# Patient Record
Sex: FEMALE | Race: WHITE | NOT HISPANIC OR LATINO | Employment: OTHER | URBAN - NONMETROPOLITAN AREA
[De-identification: names, ages, dates, MRNs, and addresses within clinical notes are randomized per-mention and may not be internally consistent; named-entity substitution may affect disease eponyms.]

---

## 2021-04-12 ENCOUNTER — OFFICE VISIT (OUTPATIENT)
Dept: FAMILY MEDICINE CLINIC | Facility: CLINIC | Age: 61
End: 2021-04-12

## 2021-04-12 VITALS
SYSTOLIC BLOOD PRESSURE: 130 MMHG | HEIGHT: 64 IN | BODY MASS INDEX: 30.01 KG/M2 | HEART RATE: 61 BPM | OXYGEN SATURATION: 97 % | TEMPERATURE: 97.7 F | DIASTOLIC BLOOD PRESSURE: 62 MMHG | WEIGHT: 175.8 LBS

## 2021-04-12 DIAGNOSIS — Z00.00 GENERAL MEDICAL EXAM: ICD-10-CM

## 2021-04-12 DIAGNOSIS — E11.9 TYPE 2 DIABETES MELLITUS WITHOUT COMPLICATION, WITHOUT LONG-TERM CURRENT USE OF INSULIN (HCC): ICD-10-CM

## 2021-04-12 DIAGNOSIS — Z12.31 ENCOUNTER FOR SCREENING MAMMOGRAM FOR MALIGNANT NEOPLASM OF BREAST: ICD-10-CM

## 2021-04-12 DIAGNOSIS — Z76.89 ENCOUNTER TO ESTABLISH CARE: Primary | ICD-10-CM

## 2021-04-12 DIAGNOSIS — J30.9 ALLERGIC RHINITIS, UNSPECIFIED SEASONALITY, UNSPECIFIED TRIGGER: ICD-10-CM

## 2021-04-12 PROCEDURE — 99204 OFFICE O/P NEW MOD 45 MIN: CPT | Performed by: NURSE PRACTITIONER

## 2021-04-12 RX ORDER — OMEPRAZOLE 20 MG/1
20 CAPSULE, DELAYED RELEASE ORAL DAILY
COMMUNITY
End: 2021-05-12 | Stop reason: SDUPTHER

## 2021-04-12 RX ORDER — FENOFIBRATE 145 MG/1
145 TABLET, COATED ORAL DAILY
COMMUNITY
End: 2021-05-12 | Stop reason: SDUPTHER

## 2021-04-12 RX ORDER — GLIPIZIDE 10 MG/1
10 TABLET ORAL DAILY
COMMUNITY
End: 2021-05-12 | Stop reason: SDUPTHER

## 2021-04-12 RX ORDER — METFORMIN HYDROCHLORIDE 500 MG/1
1000 TABLET, EXTENDED RELEASE ORAL 2 TIMES DAILY
COMMUNITY
End: 2021-05-12 | Stop reason: SDUPTHER

## 2021-04-12 RX ORDER — DIPHENOXYLATE HYDROCHLORIDE AND ATROPINE SULFATE 2.5; .025 MG/1; MG/1
1 TABLET ORAL 3 TIMES DAILY
COMMUNITY
End: 2021-05-10

## 2021-04-12 RX ORDER — ATORVASTATIN CALCIUM 20 MG/1
20 TABLET, FILM COATED ORAL DAILY
COMMUNITY
End: 2021-05-12 | Stop reason: SDUPTHER

## 2021-04-12 RX ORDER — VENLAFAXINE HYDROCHLORIDE 150 MG/1
150 TABLET, EXTENDED RELEASE ORAL DAILY
COMMUNITY
End: 2021-05-12 | Stop reason: SDUPTHER

## 2021-04-12 RX ORDER — EMPAGLIFLOZIN AND LINAGLIPTIN 25; 5 MG/1; MG/1
25 TABLET, FILM COATED ORAL DAILY
COMMUNITY
End: 2021-05-12 | Stop reason: SDUPTHER

## 2021-04-12 RX ORDER — BACLOFEN 10 MG/1
10 TABLET ORAL 3 TIMES DAILY
COMMUNITY
End: 2021-05-03

## 2021-04-12 RX ORDER — LISINOPRIL 5 MG/1
5 TABLET ORAL DAILY
COMMUNITY
End: 2021-05-12 | Stop reason: SDUPTHER

## 2021-04-12 RX ORDER — ERGOCALCIFEROL 1.25 MG/1
50000 CAPSULE ORAL
COMMUNITY
End: 2021-05-12 | Stop reason: SDUPTHER

## 2021-04-12 RX ORDER — LORATADINE 10 MG/1
10 TABLET ORAL DAILY
Qty: 30 TABLET | Refills: 11 | Status: SHIPPED | OUTPATIENT
Start: 2021-04-12

## 2021-04-12 RX ORDER — IBUPROFEN 200 MG
400 TABLET ORAL 2 TIMES DAILY
COMMUNITY
End: 2021-05-12 | Stop reason: SDUPTHER

## 2021-04-12 RX ORDER — VERAPAMIL HYDROCHLORIDE 120 MG/1
120 TABLET, FILM COATED ORAL 2 TIMES DAILY
COMMUNITY
End: 2021-05-12 | Stop reason: SDUPTHER

## 2021-04-12 NOTE — PROGRESS NOTES
Subjective   Katiana Jin is a 61 y.o. female. Establish care    History of Present Illness   Patient presents today to establish care. She is accompanied by her niece who helped provide her information. Pt says she recently moved back to [place] after living in [place]. Her niece says she moved back here after her boyfriend  and she was hospitalized due to high blood sugar. During her hospitalization she was positive for COVID 19. Pt has a boyfriend but does not have any children. She says her dog is like her child. Past medical history includes: mixed hyperlipidemia, type 2 diabetes, essential hypertension, anxiety, and depression. Surgical history includes total hysterectomy and cholecystectomy. Denies any tobacco or alcohol use. Pt is interested in getting COVID 19 vaccine.     The following portions of the patient's history were reviewed and updated as appropriate: allergies, current medications, past family history, past medical history, past social history, past surgical history, and problem list.    Review of Systems   Constitutional: Negative for chills, fatigue and fever.   HENT: Positive for rhinorrhea. Negative for congestion, ear pain and sore throat.    Eyes: Negative for blurred vision, double vision and visual disturbance.   Respiratory: Negative for cough, chest tightness, shortness of breath and wheezing.    Cardiovascular: Negative for chest pain, palpitations and leg swelling.   Gastrointestinal: Negative for abdominal pain, diarrhea, nausea and vomiting.   Endocrine: Negative for cold intolerance and heat intolerance.   Genitourinary: Negative for difficulty urinating, dysuria, frequency and hematuria.   Musculoskeletal: Negative for arthralgias, back pain, neck pain and neck stiffness.   Skin: Negative for dry skin, pallor, rash, skin lesions and bruise.   Allergic/Immunologic: Negative for environmental allergies, food allergies and immunocompromised state.   Neurological:  Negative for dizziness, syncope, weakness, light-headedness, headache and confusion.   Hematological: Negative for adenopathy. Does not bruise/bleed easily.   Psychiatric/Behavioral: Negative for self-injury, sleep disturbance, suicidal ideas, depressed mood and stress. The patient is not nervous/anxious.        Vitals:    04/12/21 0823   BP: 130/62   Pulse: 61   Temp: 97.7 °F (36.5 °C)   SpO2: 97%     Body mass index is 30.18 kg/m².    Objective   Physical Exam  Vitals reviewed.   Constitutional:       General: She is not in acute distress.     Appearance: Normal appearance. She is well-developed. She is not ill-appearing.   HENT:      Head: Normocephalic.      Right Ear: Hearing, tympanic membrane, ear canal and external ear normal.      Left Ear: Hearing, tympanic membrane, ear canal and external ear normal.      Nose: Mucosal edema present.      Mouth/Throat:      Lips: Pink.      Mouth: Mucous membranes are moist.      Pharynx: Oropharynx is clear. Uvula midline. No oropharyngeal exudate.      Tonsils: No tonsillar exudate or tonsillar abscesses.   Eyes:      General: Lids are normal. Gaze aligned appropriately.      Extraocular Movements: Extraocular movements intact.      Conjunctiva/sclera: Conjunctivae normal.      Pupils: Pupils are equal, round, and reactive to light.   Neck:      Thyroid: No thyroid mass, thyromegaly or thyroid tenderness.   Cardiovascular:      Rate and Rhythm: Normal rate and regular rhythm.      Heart sounds: Normal heart sounds, S1 normal and S2 normal. No murmur heard.     Pulmonary:      Effort: Pulmonary effort is normal.      Breath sounds: Normal breath sounds and air entry. No decreased breath sounds, wheezing, rhonchi or rales.   Abdominal:      General: Abdomen is flat. Bowel sounds are normal.      Palpations: Abdomen is soft.      Tenderness: There is no abdominal tenderness. There is no right CVA tenderness, left CVA tenderness, guarding or rebound. Negative signs include  Kulkarni's sign, Rovsing's sign, McBurney's sign, psoas sign and obturator sign.   Musculoskeletal:         General: Normal range of motion.      Cervical back: Full passive range of motion without pain, normal range of motion and neck supple.   Lymphadenopathy:      Cervical: No cervical adenopathy.   Skin:     General: Skin is warm and dry.   Neurological:      General: No focal deficit present.      Mental Status: She is alert and oriented to person, place, and time.      Coordination: Coordination is intact.      Gait: Gait is intact.   Psychiatric:         Attention and Perception: Attention normal.         Mood and Affect: Mood normal.         Speech: Speech normal.         Behavior: Behavior normal. Behavior is cooperative.         Thought Content: Thought content normal.         Cognition and Memory: Cognition normal.         Judgment: Judgment normal.           Assessment/Plan   Diagnoses and all orders for this visit:    1. Encounter to establish care (Primary)    2. Encounter for screening mammogram for malignant neoplasm of breast  -     Mammo Screening Digital Tomosynthesis Bilateral With CAD; Future    3. General medical exam  -     Lipid Panel; Future  -     CBC (No Diff); Future  -     Comprehensive metabolic panel; Future  -     TSH; Future    4. Type 2 diabetes mellitus without complication, without long-term current use of insulin (CMS/[facility])  -     Hemoglobin A1c; Future    5. Allergic rhinitis, unspecified seasonality, unspecified trigger  -     loratadine (Claritin) 10 MG tablet; Take 1 tablet by mouth Daily.  Dispense: 30 tablet; Refill: 11    Mammogram ordered for breast cancer screening, will call pt with results.  General labs ordered and hemoglobin a1c ordered, pt to return for labs once I have labs from previous PCP.  Pt instructed to take Claritin 10 mg daily for allergic rhinitis.  Pt plans to get COVID 19 vaccine, pt educated to wait at least 90 days from positive COVID 19 test to receive  vaccine.   Awaiting records from previous PCP.  If symptoms do not improve or worsen, patient was instructed to return to clinic for further evaluation.         This document has been electronically signed by ANN-MARIE Mcgill on  April 12, 2021 10:49 CDT

## 2021-05-03 ENCOUNTER — OFFICE VISIT (OUTPATIENT)
Dept: FAMILY MEDICINE CLINIC | Facility: CLINIC | Age: 61
End: 2021-05-03

## 2021-05-03 VITALS
HEART RATE: 82 BPM | DIASTOLIC BLOOD PRESSURE: 72 MMHG | HEIGHT: 64 IN | WEIGHT: 182.6 LBS | OXYGEN SATURATION: 96 % | BODY MASS INDEX: 31.18 KG/M2 | SYSTOLIC BLOOD PRESSURE: 122 MMHG | TEMPERATURE: 97.5 F

## 2021-05-03 DIAGNOSIS — M54.2 NECK PAIN: ICD-10-CM

## 2021-05-03 DIAGNOSIS — M25.511 ACUTE PAIN OF RIGHT SHOULDER: Primary | ICD-10-CM

## 2021-05-03 DIAGNOSIS — M25.561 ACUTE PAIN OF RIGHT KNEE: ICD-10-CM

## 2021-05-03 PROCEDURE — 99213 OFFICE O/P EST LOW 20 MIN: CPT | Performed by: NURSE PRACTITIONER

## 2021-05-03 RX ORDER — TIZANIDINE HYDROCHLORIDE 4 MG/1
4 CAPSULE, GELATIN COATED ORAL 3 TIMES DAILY PRN
Qty: 90 CAPSULE | Refills: 0 | Status: SHIPPED | OUTPATIENT
Start: 2021-05-03 | End: 2021-10-25

## 2021-05-03 NOTE — PROGRESS NOTES
"Subjective   Katiana Jin is a 61 y.o. female. Pain     History of Present Illness   Patient presents with her niece today for pain. She says she is experiencing pain in her neck and right knee. Neck pain is located on right side and radiates to her right shoulder. This pain started about 2 months ago. Pt reports a fall. Xray's of cervical spine after fall were unremarkable. She was given muscle relaxer which she says didn't provide much relief. Her right knee pain started 2 days ago. She says her knee will \"lock up\" at times. Pain is located around medial joint line. Advil and tylenol have not helped. No recent injuries to right knee.    The following portions of the patient's history were reviewed and updated as appropriate: allergies, current medications, past family history, past medical history, past social history, past surgical history, and problem list.    Review of Systems   Constitutional: Negative for chills, fatigue and fever.   HENT: Negative for congestion, ear pain, rhinorrhea and sore throat.    Eyes: Negative for blurred vision, double vision and visual disturbance.   Respiratory: Negative for cough, chest tightness, shortness of breath and wheezing.    Cardiovascular: Negative for chest pain, palpitations and leg swelling.   Gastrointestinal: Negative for abdominal pain, diarrhea, nausea and vomiting.   Endocrine: Negative for cold intolerance and heat intolerance.   Genitourinary: Negative for difficulty urinating, dysuria, frequency and hematuria.   Musculoskeletal: Positive for arthralgias (right knee pain, right shoulder pain) and neck pain. Negative for back pain and neck stiffness.   Skin: Negative for dry skin, pallor, rash, skin lesions and bruise.   Allergic/Immunologic: Negative for environmental allergies, food allergies and immunocompromised state.   Neurological: Negative for dizziness, syncope, weakness, light-headedness, headache and confusion.   Hematological: Negative for " adenopathy. Does not bruise/bleed easily.   Psychiatric/Behavioral: Negative for self-injury, sleep disturbance, suicidal ideas, depressed mood and stress. The patient is not nervous/anxious.        Vitals:    05/03/21 1555   BP: 122/72   Pulse: 82   Temp: 97.5 °F (36.4 °C)   SpO2: 96%     Body mass index is 31.34 kg/m².    Objective   Physical Exam  Vitals and nursing note reviewed.   Constitutional:       General: She is not in acute distress.     Appearance: Normal appearance. She is well-developed. She is not ill-appearing.   HENT:      Head: Normocephalic.   Eyes:      Conjunctiva/sclera: Conjunctivae normal.   Neck:     Cardiovascular:      Rate and Rhythm: Normal rate and regular rhythm.      Heart sounds: Normal heart sounds, S1 normal and S2 normal. No murmur heard.     Pulmonary:      Effort: Pulmonary effort is normal.      Breath sounds: Normal breath sounds and air entry. No decreased breath sounds, wheezing, rhonchi or rales.   Musculoskeletal:         General: Normal range of motion.      Right shoulder: Bony tenderness (AC joint) present. No swelling, deformity, effusion, laceration, tenderness or crepitus. Normal range of motion. Normal strength. Normal pulse.      Cervical back: Normal range of motion and neck supple. Pain with movement present.   Skin:     General: Skin is warm and dry.   Neurological:      Mental Status: She is alert and oriented to person, place, and time.      Coordination: Coordination normal.      Gait: Gait normal.   Psychiatric:         Attention and Perception: Attention normal.         Mood and Affect: Mood normal.         Speech: Speech normal.         Behavior: Behavior normal. Behavior is cooperative.         Thought Content: Thought content normal.         Cognition and Memory: Cognition normal.         Judgment: Judgment normal.           Assessment/Plan   Diagnoses and all orders for this visit:    1. Acute pain of right shoulder (Primary)  -     XR Shoulder 2+ View  Right (In Office)    2. Acute pain of right knee  -     XR Knee 1 or 2 View Right (In Office)    3. Neck pain  -     TiZANidine (ZANAFLEX) 4 MG capsule; Take 1 capsule by mouth 3 (Three) Times a Day As Needed for Muscle Spasms.  Dispense: 90 capsule; Refill: 0    Xray's ordered of right shoulder and right knee. I suspect arthritis in shoulder and knee. Will call pt with xray results. Neck pain is more consistent with muscle strain.   Pt instructed to rest, ice, stretch neck.   Treatment of right shoulder and right knee will depend on xrays.  If symptoms do not improve or worsen, patient was instructed to return to clinic for further evaluation.         This document has been electronically signed by ANN-MARIE Mcgill on  May 4, 2021 09:20 CDT

## 2021-05-04 ENCOUNTER — LAB (OUTPATIENT)
Dept: LAB | Facility: HOSPITAL | Age: 61
End: 2021-05-04

## 2021-05-04 DIAGNOSIS — M19.90 ARTHRITIS: Primary | ICD-10-CM

## 2021-05-04 DIAGNOSIS — M19.90 ARTHRITIS: ICD-10-CM

## 2021-05-04 PROCEDURE — 80053 COMPREHEN METABOLIC PANEL: CPT

## 2021-05-05 LAB
ALBUMIN SERPL-MCNC: 4.3 G/DL (ref 3.5–5.2)
ALBUMIN/GLOB SERPL: 1.7 G/DL
ALP SERPL-CCNC: 88 U/L (ref 39–117)
ALT SERPL W P-5'-P-CCNC: 14 U/L (ref 1–33)
ANION GAP SERPL CALCULATED.3IONS-SCNC: 14.7 MMOL/L (ref 5–15)
AST SERPL-CCNC: 15 U/L (ref 1–32)
BILIRUB SERPL-MCNC: 0.4 MG/DL (ref 0–1.2)
BUN SERPL-MCNC: 25 MG/DL (ref 8–23)
BUN/CREAT SERPL: 27.2 (ref 7–25)
CALCIUM SPEC-SCNC: 9.6 MG/DL (ref 8.6–10.5)
CHLORIDE SERPL-SCNC: 101 MMOL/L (ref 98–107)
CO2 SERPL-SCNC: 24.3 MMOL/L (ref 22–29)
CREAT SERPL-MCNC: 0.92 MG/DL (ref 0.57–1)
GFR SERPL CREATININE-BSD FRML MDRD: 62 ML/MIN/1.73
GLOBULIN UR ELPH-MCNC: 2.5 GM/DL
GLUCOSE SERPL-MCNC: 300 MG/DL (ref 65–99)
POTASSIUM SERPL-SCNC: 4.3 MMOL/L (ref 3.5–5.2)
PROT SERPL-MCNC: 6.8 G/DL (ref 6–8.5)
SODIUM SERPL-SCNC: 140 MMOL/L (ref 136–145)

## 2021-05-06 ENCOUNTER — LAB (OUTPATIENT)
Dept: LAB | Facility: HOSPITAL | Age: 61
End: 2021-05-06

## 2021-05-06 DIAGNOSIS — E11.9 TYPE 2 DIABETES MELLITUS WITHOUT COMPLICATION, WITHOUT LONG-TERM CURRENT USE OF INSULIN (HCC): ICD-10-CM

## 2021-05-06 DIAGNOSIS — E11.9 TYPE 2 DIABETES MELLITUS WITHOUT COMPLICATION, WITHOUT LONG-TERM CURRENT USE OF INSULIN (HCC): Primary | ICD-10-CM

## 2021-05-06 PROCEDURE — 83036 HEMOGLOBIN GLYCOSYLATED A1C: CPT

## 2021-05-07 LAB — HBA1C MFR BLD: 9.9 % (ref 4.8–5.6)

## 2021-05-10 ENCOUNTER — OFFICE VISIT (OUTPATIENT)
Dept: FAMILY MEDICINE CLINIC | Facility: CLINIC | Age: 61
End: 2021-05-10

## 2021-05-10 VITALS
HEART RATE: 78 BPM | WEIGHT: 180.4 LBS | OXYGEN SATURATION: 98 % | HEIGHT: 64 IN | SYSTOLIC BLOOD PRESSURE: 120 MMHG | BODY MASS INDEX: 30.8 KG/M2 | DIASTOLIC BLOOD PRESSURE: 78 MMHG

## 2021-05-10 DIAGNOSIS — M25.511 ACUTE PAIN OF RIGHT SHOULDER: ICD-10-CM

## 2021-05-10 DIAGNOSIS — E11.9 TYPE 2 DIABETES MELLITUS WITHOUT COMPLICATION, WITHOUT LONG-TERM CURRENT USE OF INSULIN (HCC): Primary | ICD-10-CM

## 2021-05-10 PROCEDURE — 99213 OFFICE O/P EST LOW 20 MIN: CPT | Performed by: NURSE PRACTITIONER

## 2021-05-10 RX ORDER — VENLAFAXINE HYDROCHLORIDE 150 MG/1
150 TABLET, EXTENDED RELEASE ORAL DAILY
Qty: 30 EACH | Status: CANCELLED | OUTPATIENT
Start: 2021-05-10

## 2021-05-10 NOTE — PROGRESS NOTES
"Subjective   Katiana Jin is a 61 y.o. female. Diabetes     History of Present Illness   Patient presents today with her niece for diabetes. Her recent hemoglobin a1c was 9. Her niece says she was forgetting to take her medications but now that she has moved closer to her she is ensuring that she is taking all diabetic medications as prescribed. Her niece reports that her hemoglobin a1c was at 14 when she was hospitalized in April. Pt says she does not follow diabetic diet. She eats twice daily. Yesterday she had [employer] cereal for breakfast and captain [employer]'s for dinner. Pt reports drinking diet sodas and water for hydration.     Shoulder pain (right)  Her niece is also concerned about her right shoulder. Pt says her shoulder pain has improved with tylenol and ibuprofen but her niece recently found out she had a \"tear in her shoulder\" and it was recommended she have surgery. She is unsure which shoulder was needing surgery. [place] Pt denies any weakness or pain in her left shoulder. Her niece would like her shoulder further evaluated. Recent xray of right shoulder revealed mild degenerative changes.    The following portions of the patient's history were reviewed and updated as appropriate: allergies, current medications, past family history, past medical history, past social history, past surgical history, and problem list.    Review of Systems   Constitutional: Negative for chills, fatigue and fever.   HENT: Negative for congestion, ear pain, rhinorrhea and sore throat.    Eyes: Negative for blurred vision, double vision and visual disturbance.   Respiratory: Negative for cough, chest tightness, shortness of breath and wheezing.    Cardiovascular: Negative for chest pain, palpitations and leg swelling.   Gastrointestinal: Negative for abdominal pain, diarrhea, nausea and vomiting.   Endocrine: Negative for cold intolerance and heat intolerance. "   Genitourinary: Negative for difficulty urinating, dysuria, frequency and hematuria.   Musculoskeletal: Positive for arthralgias (shoulder pain (right)). Negative for back pain, neck pain and neck stiffness.   Skin: Negative for dry skin, pallor, rash, skin lesions and bruise.   Allergic/Immunologic: Negative for environmental allergies, food allergies and immunocompromised state.   Neurological: Negative for dizziness, syncope, weakness, light-headedness, headache and confusion.   Hematological: Negative for adenopathy. Does not bruise/bleed easily.   Psychiatric/Behavioral: Negative for self-injury, sleep disturbance, suicidal ideas, depressed mood and stress. The patient is not nervous/anxious.        Vitals:    05/10/21 1321   BP: 120/78   Pulse: 78   SpO2: 98%     Body mass index is 30.97 kg/m².    Objective   Physical Exam  Vitals and nursing note reviewed.   Constitutional:       Appearance: She is well-developed.   HENT:      Head: Normocephalic.   Eyes:      Conjunctiva/sclera: Conjunctivae normal.   Musculoskeletal:         General: Normal range of motion.      Right shoulder: Normal. No tenderness or bony tenderness. Normal range of motion. Normal strength.      Cervical back: Full passive range of motion without pain, normal range of motion and neck supple.   Skin:     General: Skin is warm and dry.   Neurological:      General: No focal deficit present.      Mental Status: She is alert and oriented to person, place, and time.      Coordination: Coordination normal.      Gait: Gait is intact. Gait normal.   Psychiatric:         Attention and Perception: Attention normal.         Mood and Affect: Mood normal.         Speech: Speech normal.         Behavior: Behavior normal. Behavior is cooperative.         Thought Content: Thought content normal.         Cognition and Memory: Cognition normal.         Judgment: Judgment normal.           Assessment/Plan   Diagnoses and all orders for this visit:    1.  "Type 2 diabetes mellitus without complication, without long-term current use of insulin (CMS/[facility]) (Primary)  -     Hemoglobin A1c; Future    2. Acute pain of right shoulder  -     Ambulatory Referral to Orthopedic Surgery    Other orders  -     Cancel: diclofenac (VOLTAREN) 50 MG EC tablet; Take 1 tablet by mouth 2 (Two) Times a Day As Needed (arthritis pain).  Dispense: 60 tablet; Refill: 1    Type 2 diabetes- Pt not currently eating diabetic diet. Stressed importance of eating diabetic diet to help improve glycemic control. Recommended eating more lean meats (chicken, fish), vegetables, fruit (in moderation). Limit/avoid foods high in carbohydrates (breads, pastas, rice, chips, potatos, cake, cookies) and eating fast food. Recommended checking fasting blood sugar in the a.m and keeping diary for my review. Plan to keep diabetic medications the same at this time due to possible non compliance. Will recheck hemoglobin a1c in 3 months.  Referral to Orthopedics for report of \"tear in shoulder that required surgery\". Right shoulder exam unremarkable, recent xrays show mild arthritis changes. Tylenol and ibuprofen have been providing pain relief.  If symptoms do not improve or worsen, patient was instructed to return to clinic for further evaluation.         This document has been electronically signed by ANN-MARIE Mcgill on  May 10, 2021 15:17 CDT             "

## 2021-05-12 RX ORDER — EMPAGLIFLOZIN AND LINAGLIPTIN 25; 5 MG/1; MG/1
25 TABLET, FILM COATED ORAL DAILY
Qty: 90 TABLET | Refills: 0 | Status: SHIPPED | OUTPATIENT
Start: 2021-05-12 | End: 2021-08-10

## 2021-05-12 RX ORDER — IBUPROFEN 200 MG
400 TABLET ORAL EVERY 8 HOURS PRN
Qty: 60 TABLET | Refills: 1 | Status: SHIPPED | OUTPATIENT
Start: 2021-05-12 | End: 2022-01-19

## 2021-05-12 RX ORDER — ATORVASTATIN CALCIUM 20 MG/1
20 TABLET, FILM COATED ORAL NIGHTLY
Qty: 90 TABLET | Refills: 3 | Status: SHIPPED | OUTPATIENT
Start: 2021-05-12

## 2021-05-12 RX ORDER — LISINOPRIL 5 MG/1
5 TABLET ORAL DAILY
Qty: 90 TABLET | Refills: 3 | Status: SHIPPED | OUTPATIENT
Start: 2021-05-12

## 2021-05-12 RX ORDER — OMEPRAZOLE 20 MG/1
20 CAPSULE, DELAYED RELEASE ORAL DAILY
Qty: 90 CAPSULE | Refills: 3 | Status: SHIPPED | OUTPATIENT
Start: 2021-05-12

## 2021-05-12 RX ORDER — METFORMIN HYDROCHLORIDE 500 MG/1
1000 TABLET, EXTENDED RELEASE ORAL 2 TIMES DAILY
Qty: 180 TABLET | Refills: 0 | Status: SHIPPED | OUTPATIENT
Start: 2021-05-12 | End: 2021-08-10 | Stop reason: SDUPTHER

## 2021-05-12 RX ORDER — VENLAFAXINE HYDROCHLORIDE 150 MG/1
150 TABLET, EXTENDED RELEASE ORAL DAILY
Qty: 90 EACH | Refills: 3 | Status: SHIPPED | OUTPATIENT
Start: 2021-05-12 | End: 2021-05-13

## 2021-05-12 RX ORDER — GLIPIZIDE 10 MG/1
10 TABLET ORAL DAILY
Qty: 90 TABLET | Refills: 0 | Status: SHIPPED | OUTPATIENT
Start: 2021-05-12 | End: 2021-08-10

## 2021-05-12 RX ORDER — ERGOCALCIFEROL 1.25 MG/1
CAPSULE ORAL
Qty: 6 CAPSULE | Refills: 0 | Status: SHIPPED | OUTPATIENT
Start: 2021-05-12 | End: 2021-08-02

## 2021-05-12 RX ORDER — FENOFIBRATE 48 MG/1
48 TABLET, COATED ORAL DAILY
Qty: 90 TABLET | Refills: 3 | Status: SHIPPED | OUTPATIENT
Start: 2021-05-12 | End: 2022-01-19

## 2021-05-12 RX ORDER — VERAPAMIL HYDROCHLORIDE 120 MG/1
120 TABLET, FILM COATED ORAL 2 TIMES DAILY
Qty: 180 TABLET | Refills: 3 | Status: SHIPPED | OUTPATIENT
Start: 2021-05-12

## 2021-05-12 NOTE — TELEPHONE ENCOUNTER
REFILL  10 PRESCRIPTIONS I SELECTED AND EFFEXOR (IT SAID IT WAS ALREADY PENDING)  ANGELES [facility]

## 2021-05-13 RX ORDER — VENLAFAXINE HYDROCHLORIDE 150 MG/1
150 CAPSULE, EXTENDED RELEASE ORAL DAILY
Qty: 90 CAPSULE | Refills: 3 | Status: SHIPPED | OUTPATIENT
Start: 2021-05-13

## 2021-05-14 RX ORDER — METFORMIN HYDROCHLORIDE 500 MG/1
TABLET, EXTENDED RELEASE ORAL
Qty: 360 TABLET | OUTPATIENT
Start: 2021-05-14

## 2021-06-28 RX ORDER — METFORMIN HYDROCHLORIDE 500 MG/1
TABLET, EXTENDED RELEASE ORAL
Qty: 180 TABLET | Refills: 0 | OUTPATIENT
Start: 2021-06-28

## 2021-07-21 ENCOUNTER — OFFICE VISIT (OUTPATIENT)
Dept: ORTHOPEDIC SURGERY | Facility: CLINIC | Age: 61
End: 2021-07-21

## 2021-07-21 VITALS — WEIGHT: 193 LBS | HEIGHT: 64 IN | BODY MASS INDEX: 32.95 KG/M2

## 2021-07-21 DIAGNOSIS — M75.101 ROTATOR CUFF SYNDROME OF RIGHT SHOULDER: ICD-10-CM

## 2021-07-21 DIAGNOSIS — M25.511 CHRONIC RIGHT SHOULDER PAIN: Primary | ICD-10-CM

## 2021-07-21 DIAGNOSIS — G89.29 CHRONIC RIGHT SHOULDER PAIN: Primary | ICD-10-CM

## 2021-07-21 PROCEDURE — 99214 OFFICE O/P EST MOD 30 MIN: CPT | Performed by: NURSE PRACTITIONER

## 2021-07-21 PROCEDURE — 20610 DRAIN/INJ JOINT/BURSA W/O US: CPT | Performed by: NURSE PRACTITIONER

## 2021-07-21 RX ORDER — TRIAMCINOLONE ACETONIDE 40 MG/ML
40 INJECTION, SUSPENSION INTRA-ARTICULAR; INTRAMUSCULAR
Status: COMPLETED | OUTPATIENT
Start: 2021-07-21 | End: 2021-07-21

## 2021-07-21 RX ORDER — LIDOCAINE HYDROCHLORIDE 10 MG/ML
2 INJECTION, SOLUTION INFILTRATION; PERINEURAL
Status: COMPLETED | OUTPATIENT
Start: 2021-07-21 | End: 2021-07-21

## 2021-07-21 RX ADMIN — LIDOCAINE HYDROCHLORIDE 2 ML: 10 INJECTION, SOLUTION INFILTRATION; PERINEURAL at 10:59

## 2021-07-21 RX ADMIN — TRIAMCINOLONE ACETONIDE 40 MG: 40 INJECTION, SUSPENSION INTRA-ARTICULAR; INTRAMUSCULAR at 10:59

## 2021-07-21 NOTE — PROGRESS NOTES
"Katiana Jin is a 61 y.o. female   Primary provider:  Veena Simmons APRN       Chief Complaint   Patient presents with   • Right Shoulder - Shoulder Pain     HISTORY OF PRESENT ILLNESS:     61-year-old female patient presents to office accompanied by her daughter for evaluation of chronic right shoulder pain.  Onset of pain occurred approximately 2 years ago with no known injury.  Patient has recently moved here from [place].  She was previously evaluated by provider in [place] and was told that she needed surgery and had a \"tear\" in her right shoulder.  The patient does not know any other details and the daughter is unsure of the details as well.  Patient was evaluated by her primary care provider, ANN-MARIE Mcgill on 5/3/2021 with x-rays performed.  Patient states that her pain has increased in recent months and they attributed to possibly her move in May 2021 as she was lifting more items and boxes at that time. Patient complains of sleep disturbance due to aching pain in her right shoulder at nighttime.  Pain is described as constant and severe.  Pain is described as aching and burning in nature with weakness and limited range of motion.  Pain is worse with sleeping and is also worse with exertional use with her right arm for lifting.  Pain improves mildly with rest, anti-inflammatory medications and muscle relaxants.    Arm Pain   The incident occurred more than 1 week ago (About 2 years ago). There was no injury mechanism. The pain is present in the right shoulder. The quality of the pain is described as aching and burning. The pain is at a severity of 0/10. The pain is severe. The pain has been constant since the incident. Associated symptoms include muscle weakness. Associated symptoms comments: Limited ROM, weakness. She has tried acetaminophen, NSAIDs and rest (muscle relaxant) for the symptoms. The treatment provided mild relief.     CONCURRENT MEDICAL HISTORY:    Past Medical History: "   Diagnosis Date   • Anxiety and depression    • Diabetes (CMS/[facility])    • Essential hypertension    • GERD (gastroesophageal reflux disease)    • Hyperlipidemia        Allergies   Allergen Reactions   • Albuterol GI Intolerance         Current Outpatient Medications:   •  atorvastatin (LIPITOR) 20 MG tablet, Take 1 tablet by mouth Every Night., Disp: 90 tablet, Rfl: 3  •  Empagliflozin-linaGLIPtin (Glyxambi) 25-5 MG tablet, Take 25 mg by mouth Daily., Disp: 90 tablet, Rfl: 0  •  fenofibrate (TRICOR) 48 MG tablet, Take 1 tablet by mouth Daily., Disp: 90 tablet, Rfl: 3  •  glipizide (GLUCOTROL) 10 MG tablet, Take 1 tablet by mouth Daily., Disp: 90 tablet, Rfl: 0  •  ibuprofen (ADVIL,MOTRIN) 200 MG tablet, Take 2 tablets by mouth Every 8 (Eight) Hours As Needed for Mild Pain  or Moderate Pain ., Disp: 60 tablet, Rfl: 1  •  lisinopril (PRINIVIL,ZESTRIL) 5 MG tablet, Take 1 tablet by mouth Daily., Disp: 90 tablet, Rfl: 3  •  metFORMIN ER (GLUCOPHAGE-XR) 500 MG 24 hr tablet, Take 2 tablets by mouth 2 (two) times a day., Disp: 180 tablet, Rfl: 0  •  omeprazole (priLOSEC) 20 MG capsule, Take 1 capsule by mouth Daily., Disp: 90 capsule, Rfl: 3  •  venlafaxine XR (EFFEXOR-XR) 150 MG 24 hr capsule, Take 1 capsule by mouth Daily., Disp: 90 capsule, Rfl: 3  •  vitamin D (ERGOCALCIFEROL) 1.25 MG (39749 UT) capsule capsule, Twice montly on 1st and 15th, Disp: 6 capsule, Rfl: 0  •  loratadine (Claritin) 10 MG tablet, Take 1 tablet by mouth Daily., Disp: 30 tablet, Rfl: 11  •  TiZANidine (ZANAFLEX) 4 MG capsule, Take 1 capsule by mouth 3 (Three) Times a Day As Needed for Muscle Spasms., Disp: 90 capsule, Rfl: 0  •  verapamil (CALAN) 120 MG tablet, Take 1 tablet by mouth 2 (two) times a day. 1  To 2 times daily, Disp: 180 tablet, Rfl: 3    Past Surgical History:   Procedure Laterality Date   • CHOLECYSTECTOMY     • TOTAL ABDOMINAL HYSTERECTOMY         Family History   Problem Relation Age of Onset   • Emphysema Mother    • Diabetes  "Father    • Lung cancer Father    • Stroke Brother    • Stomach cancer Maternal Grandmother         Social History     Socioeconomic History   • Marital status: Single     Spouse name: Not on file   • Number of children: Not on file   • Years of education: Not on file   • Highest education level: Not on file   Tobacco Use   • Smoking status: Never Smoker   • Smokeless tobacco: Never Used        Review of Systems   Constitutional: Positive for activity change.   HENT: Negative.    Respiratory: Negative.    Cardiovascular: Negative.    Gastrointestinal: Negative.    Endocrine: Positive for cold intolerance and heat intolerance.   Genitourinary: Negative.    Musculoskeletal: Positive for arthralgias.        Right shoulder pain.    Skin: Negative.    Allergic/Immunologic: Negative.    Neurological: Positive for dizziness.   Hematological: Negative.    Psychiatric/Behavioral: Positive for sleep disturbance.       PHYSICAL EXAMINATION:       Ht 162.6 cm (64\")   Wt 87.5 kg (193 lb)   BMI 33.13 kg/m²     Physical Exam  Vitals reviewed.   Constitutional:       General: She is not in acute distress.     Appearance: She is well-developed. She is not ill-appearing.   HENT:      Head: Normocephalic.   Pulmonary:      Effort: Pulmonary effort is normal. No respiratory distress.   Abdominal:      General: There is no distension.      Palpations: Abdomen is soft.   Musculoskeletal:         General: Tenderness (Mild, right shoulder) present. No swelling, deformity or signs of injury.   Skin:     General: Skin is warm and dry.      Capillary Refill: Capillary refill takes less than 2 seconds.      Findings: No erythema.   Neurological:      Mental Status: She is alert and oriented to person, place, and time.      GCS: GCS eye subscore is 4. GCS verbal subscore is 5. GCS motor subscore is 6.   Psychiatric:         Speech: Speech normal.         Behavior: Behavior normal.         Thought Content: Thought content normal.         " Judgment: Judgment normal.         GAIT:     [x]  Normal  []  Antalgic    Assistive device: [x]  None  []  Walker     []  Crutches  []  Cane     []  Wheelchair  []  Stretcher    Right Shoulder Exam     Range of Motion   Active abduction: 160   External rotation: 80   Forward flexion: 140   Internal rotation 90 degrees: 70     Muscle Strength   Abduction: 3/5   Supraspinatus: 3/5     Tests   Judd test: positive  Cross arm: positive  Impingement: positive  Drop arm: negative    Other   Erythema: absent  Sensation: normal  Pulse: present    Comments:  Weakness is present.  Empty can test positive.  Full can test positive.      Left Shoulder Exam     Tenderness   The patient is experiencing no tenderness.     Range of Motion   The patient has normal left shoulder ROM.    Muscle Strength   Abduction: 4/5   Supraspinatus: 4/5     Tests   Judd test: negative  Cross arm: negative  Impingement: negative  Drop arm: negative    Other   Erythema: absent  Sensation: normal  Pulse: present               Narrative & Impression   EXAM: XR SHOULDER 2 OR MORE VIEWS     INDICATION: right shoulder pain, fall back in march, M25.511 Pain  in right shoulder     COMPARISON: None     FINDINGS: The right shoulder is negative for acute fracture or  dislocation. Mild hypertrophic degenerative change about the  right glenohumeral and acromioclavicular joints. No periarticular  calcification.     IMPRESSION:  Mild degenerative arthritis about the right shoulder  without acute bony abnormality.        Electronically signed by:  Josef Rivero MD  5/4/2021 7:33 AM CDT  Workstation: [id number]     ASSESSMENT:    Diagnoses and all orders for this visit:    Chronic right shoulder pain  -     Large Joint Arthrocentesis: R subacromial bursa    Rotator cuff syndrome of right shoulder  -     Large Joint Arthrocentesis: R subacromial bursa      Large Joint Arthrocentesis: R subacromial bursa  Date/Time: 7/21/2021 10:59 AM  Consent given by:  "patient  Timeout: Immediately prior to procedure a time out was called to verify the correct patient, procedure, equipment, support staff and site/side marked as required   Supporting Documentation  Indications: pain and diagnostic evaluation   Procedure Details  Location: shoulder - R subacromial bursa  Preparation: Patient was prepped and draped in the usual sterile fashion  Needle size: 22 G  Approach: posterior  Medications administered: 40 mg triamcinolone acetonide 40 MG/ML; 2 mL lidocaine 1 %  Patient tolerance: patient tolerated the procedure well with no immediate complications      PLAN    X-rays of the right shoulder performed on 5/3/2021 are independently reviewed by myself today.  Patient has some mild degenerative changes at both the AC joint and glenohumeral joint.  Patient has experienced ongoing right shoulder pain for at least 2 years with no known injury.  Patient has recently moved here from [place] and is with her daughter today.  She was treated in [place] for her right shoulder pain and was told at some point that she had \"tears\" in her right shoulder and may need surgery.  We discussed that she likely has rotator cuff tears.  It is unclear if she had MRI imaging or if this was an educated guess for differential diagnoses.  Patient states that her pain has increased since May 2021 with no known injury or incident but she has recently moved around the time of the increased pain and was doing more lifting.  Patient primarily complains of aching pain in the right shoulder at nighttime that bothers her quite a bit.  We discussed ordering MRI for further evaluation and to assess the integrity of rotator cuff tendons.  We discussed she may have degenerative tears of the rotator cuff.  We also discussed the possibility of tendinitis/tendinosis, bursitis and/or impingement syndrome.  Patient and daughter want to wait on MRI imaging for now and proceed with some conservative care.  Recommend a " subacromial injection of steroid to the right shoulder today for management of pain/inflammation.  We discussed the risk of hyperglycemia with steroid use as she is diabetic.  I have reviewed her recent blood sugars as she has a log with her and her blood sugar appears to be well controlled over the last few months.  We discussed that although it is a small dose in a localized space, she may still have some transient elevations in her blood sugar following the injection but this should resolve.  Patient is encouraged to follow her diabetic diet and take her diabetic medications as well as monitor her blood sugar.    Recommend the following:  Rest and activity modification with avoidance of heavy lifting, pulling, tugging and repetitive motions for now.  Gentle, progressive range of motion exercises with the right shoulder intermittently throughout the day as pain allows.  Ice therapy to the right shoulder intermittently 3-4 times daily for 15 minutes at a time to minimize pain/inflammation.  Continue with Voltaren for consistent dosing of oral NSAIDs as previously prescribed by her PCP.  Patient can also take Tylenol as needed for additional pain control.    Follow-up in 4 weeks for recheck as needed for any new, worsening or persistent symptoms.  We discussed that if her pain improves/resolves, she can follow-up on an as-needed basis.  If she continues to have pain, limited range of motion and/or weakness, we discussed that physical therapy would be an option as well.  Consider MRI of the right shoulder if her pain persist.    Injection.  Discussed MRI if not improving.  Discussed physical therapy as an option as well. Continue Voltaren.     EMR Dragon/Transciption Disclaimer: Some of this note may be an electronic transcription/translation of spoken language to printed text.  The electronic translation of spoken language may permit erroneous, or at times, nonsensical words or phrases to be inadvertently  transcribed. Although I have reviewed the note for such errors, some may still exist.     Return in about 4 weeks (around 8/18/2021), or if symptoms worsen or fail to improve, for Recheck.        This document has been electronically signed by ANN-MARIE Arrieta on July 21, 2021 13:19 CDT      ANN-MARIE Arrieta

## 2021-08-02 RX ORDER — ERGOCALCIFEROL 1.25 MG/1
CAPSULE ORAL
Qty: 6 CAPSULE | Refills: 0 | Status: SHIPPED | OUTPATIENT
Start: 2021-08-02 | End: 2022-01-19

## 2021-08-10 RX ORDER — EMPAGLIFLOZIN AND LINAGLIPTIN 25; 5 MG/1; MG/1
TABLET, FILM COATED ORAL
Qty: 30 TABLET | Refills: 0 | Status: SHIPPED | OUTPATIENT
Start: 2021-08-10 | End: 2021-08-31

## 2021-08-10 RX ORDER — GLIPIZIDE 10 MG/1
10 TABLET ORAL DAILY
Qty: 30 TABLET | Refills: 0 | Status: SHIPPED | OUTPATIENT
Start: 2021-08-10 | End: 2021-09-22

## 2021-08-11 RX ORDER — METFORMIN HYDROCHLORIDE 500 MG/1
1000 TABLET, EXTENDED RELEASE ORAL 2 TIMES DAILY
Qty: 120 TABLET | Refills: 0 | Status: SHIPPED | OUTPATIENT
Start: 2021-08-11 | End: 2021-09-22

## 2021-08-18 ENCOUNTER — OFFICE VISIT (OUTPATIENT)
Dept: ORTHOPEDIC SURGERY | Facility: CLINIC | Age: 61
End: 2021-08-18

## 2021-08-18 VITALS — HEIGHT: 64 IN | WEIGHT: 193 LBS | BODY MASS INDEX: 32.95 KG/M2
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
## 2021-08-18 DIAGNOSIS — M25.561 ACUTE PAIN OF RIGHT KNEE: Primary | ICD-10-CM

## 2021-08-18 DIAGNOSIS — M17.11 PRIMARY OSTEOARTHRITIS OF RIGHT KNEE: ICD-10-CM

## 2021-08-18 PROCEDURE — 99214 OFFICE O/P EST MOD 30 MIN: CPT | Performed by: NURSE PRACTITIONER

## 2021-08-18 PROCEDURE — 20610 DRAIN/INJ JOINT/BURSA W/O US: CPT | Performed by: NURSE PRACTITIONER

## 2021-08-18 RX ORDER — LIDOCAINE HYDROCHLORIDE 10 MG/ML
2 INJECTION, SOLUTION INFILTRATION; PERINEURAL
Status: COMPLETED | OUTPATIENT
Start: 2021-08-18 | End: 2021-08-18

## 2021-08-18 RX ORDER — TRIAMCINOLONE ACETONIDE 40 MG/ML
40 INJECTION, SUSPENSION INTRA-ARTICULAR; INTRAMUSCULAR
Status: COMPLETED | OUTPATIENT
Start: 2021-08-18 | End: 2021-08-18

## 2021-08-18 RX ADMIN — TRIAMCINOLONE ACETONIDE 40 MG: 40 INJECTION, SUSPENSION INTRA-ARTICULAR; INTRAMUSCULAR at 10:08

## 2021-08-18 RX ADMIN — LIDOCAINE HYDROCHLORIDE 2 ML: 10 INJECTION, SOLUTION INFILTRATION; PERINEURAL at 10:08

## 2021-08-18 NOTE — PROGRESS NOTES
Katiana Jin is a 61 y.o. female   Primary provider:  Veena Simmons APRN       Chief Complaint   Patient presents with   • Right Knee - Knee Pain     HISTORY OF PRESENT ILLNESS:    61-year-old female patient presents to office accompanied by her daughter for evaluation of acute right knee pain/injury.  Initial injury occurred on 7/24/2021 when she fell/tripped while walking to a neighbor's house.  Patient was evaluated at [facility] ED on the date of the injury with x-rays performed, which were negative for any bony injury.  Patient has continued to experience right knee pain since her fall that has not resolved with conservative home care and time.  Pain is described as intermittent and moderate in severity.  Pain is described as aching in nature with associated bruising and swelling.  Patient states that her knee gives out intermittently when she is walking.  Pain is worse with lying down, standing and walking.  Pain improves mildly with rest, Tylenol and anti-inflammatory medications.  Patient is also using a quad cane for modified weightbearing with some mild improvement.  AP standing x-ray of the bilateral knees performed in office today.  Pain scale today is 6/10.    Knee Pain   The incident occurred more than 1 week ago (7/24/2021). The incident occurred in the yard. The injury mechanism was a fall. The pain is present in the right knee. The quality of the pain is described as aching. The pain is at a severity of 6/10. The pain is moderate. The pain has been intermittent since onset. Associated symptoms comments: Bruising, swelling. She reports no foreign bodies present. The symptoms are aggravated by movement and weight bearing (Lying down, standing, walking). She has tried NSAIDs, acetaminophen and rest (Use of a cane) for the symptoms. The treatment provided mild relief.     CONCURRENT MEDICAL HISTORY:    Past Medical History:   Diagnosis Date   • Anxiety and depression    • Diabetes  (CMS/[facility])    • Essential hypertension    • GERD (gastroesophageal reflux disease)    • Hyperlipidemia        Allergies   Allergen Reactions   • Albuterol GI Intolerance         Current Outpatient Medications:   •  atorvastatin (LIPITOR) 20 MG tablet, Take 1 tablet by mouth Every Night., Disp: 90 tablet, Rfl: 3  •  fenofibrate (TRICOR) 48 MG tablet, Take 1 tablet by mouth Daily., Disp: 90 tablet, Rfl: 3  •  glipizide (GLUCOTROL) 10 MG tablet, TAKE 1 TABLET BY MOUTH DAILY, Disp: 30 tablet, Rfl: 0  •  Glyxambi 25-5 MG tablet, TAKE 1 TABLET BY MOUTH EVERY DAY, Disp: 30 tablet, Rfl: 0  •  ibuprofen (ADVIL,MOTRIN) 200 MG tablet, Take 2 tablets by mouth Every 8 (Eight) Hours As Needed for Mild Pain  or Moderate Pain ., Disp: 60 tablet, Rfl: 1  •  lisinopril (PRINIVIL,ZESTRIL) 5 MG tablet, Take 1 tablet by mouth Daily., Disp: 90 tablet, Rfl: 3  •  loratadine (Claritin) 10 MG tablet, Take 1 tablet by mouth Daily., Disp: 30 tablet, Rfl: 11  •  metFORMIN ER (GLUCOPHAGE-XR) 500 MG 24 hr tablet, Take 2 tablets by mouth 2 (two) times a day., Disp: 120 tablet, Rfl: 0  •  omeprazole (priLOSEC) 20 MG capsule, Take 1 capsule by mouth Daily., Disp: 90 capsule, Rfl: 3  •  TiZANidine (ZANAFLEX) 4 MG capsule, Take 1 capsule by mouth 3 (Three) Times a Day As Needed for Muscle Spasms., Disp: 90 capsule, Rfl: 0  •  venlafaxine XR (EFFEXOR-XR) 150 MG 24 hr capsule, Take 1 capsule by mouth Daily., Disp: 90 capsule, Rfl: 3  •  verapamil (CALAN) 120 MG tablet, Take 1 tablet by mouth 2 (two) times a day. 1  To 2 times daily, Disp: 180 tablet, Rfl: 3  •  vitamin D (ERGOCALCIFEROL) 1.25 MG (81992 UT) capsule capsule, TAKE 1 CAPSULE BY MOUTH TWICE MONTHLY ON THE 1ST AND THE 15TH, Disp: 6 capsule, Rfl: 0    Past Surgical History:   Procedure Laterality Date   • CHOLECYSTECTOMY     • TOTAL ABDOMINAL HYSTERECTOMY         Family History   Problem Relation Age of Onset   • Emphysema Mother    • Diabetes Father    • Lung cancer Father    • Stroke Brother  "   • Stomach cancer Maternal Grandmother        Social History     Socioeconomic History   • Marital status: Single     Spouse name: Not on file   • Number of children: Not on file   • Years of education: Not on file   • Highest education level: Not on file   Tobacco Use   • Smoking status: Never Smoker   • Smokeless tobacco: Never Used        Review of Systems   Constitutional: Positive for activity change.   HENT: Negative.    Eyes: Negative.    Respiratory: Negative.    Cardiovascular: Negative.    Gastrointestinal: Negative.    Endocrine: Negative.    Genitourinary: Negative.    Musculoskeletal: Positive for arthralgias.        Right knee pain.    Skin: Negative.    Allergic/Immunologic: Negative.    Neurological: Negative.    Hematological: Negative.    Psychiatric/Behavioral: Positive for sleep disturbance.       PHYSICAL EXAMINATION:       Ht 162.6 cm (64\")   Wt 87.5 kg (193 lb)   BMI 33.13 kg/m²     Physical Exam  Vitals reviewed.   Constitutional:       General: She is not in acute distress.     Appearance: She is well-developed. She is not ill-appearing.   HENT:      Head: Normocephalic.   Pulmonary:      Effort: Pulmonary effort is normal. No respiratory distress.   Abdominal:      General: There is no distension.      Palpations: Abdomen is soft.   Musculoskeletal:         General: Swelling (Mild, right knee) and tenderness (Mild, right knee) present. No deformity or signs of injury.      Right knee: No effusion.      Instability Tests: Medial Caesar test negative and lateral Caesar test negative.   Skin:     General: Skin is warm and dry.      Capillary Refill: Capillary refill takes less than 2 seconds.      Findings: No erythema.   Neurological:      Mental Status: She is alert and oriented to person, place, and time.      GCS: GCS eye subscore is 4. GCS verbal subscore is 5. GCS motor subscore is 6.   Psychiatric:         Speech: Speech normal.         Behavior: Behavior normal.         Thought " "Content: Thought content normal.         Judgment: Judgment normal.         GAIT:     []  Normal  [x]  Antalgic    Assistive device: []  None  []  Walker     []  Crutches  [x]  Cane     []  Wheelchair  []  Stretcher    Right Knee Exam     Tenderness   The patient is experiencing tenderness in the medial joint line (Mild, diffuse).    Range of Motion   Extension: 0   Flexion: 110     Tests   Caesar:  Medial - negative Lateral - negative  Varus: negative Valgus: negative    Other   Erythema: absent  Sensation: normal  Pulse: present  Swelling: mild  Effusion: no effusion present            No results found.     EXAM: XR KNEE 1-2 VIEWS     INDICATION: acute right knee pain, knee \"locking\", M25.561 Pain  in right knee     COMPARISON: None     FINDINGS: Degenerative arthritis involving the right knee with  partial loss of medial compartment joint space and small  tricompartmental marginal hypertrophic osteophytes. No acute  fracture or dislocation.     No effusion.     IMPRESSION:  Mild/moderate degenerative arthritis involving the  right knee without acute bony abnormality.        Electronically signed by:  Josef Rivero MD  5/4/2021 7:33 AM CDT  Workstation: [id number]      ASSESSMENT:    Diagnoses and all orders for this visit:    Acute pain of right knee  -     Large Joint Arthrocentesis: R knee    Primary osteoarthritis of right knee  -     Large Joint Arthrocentesis: R knee    Injury due to fall, initial encounter  -     Large Joint Arthrocentesis: R knee      Large Joint Arthrocentesis: R knee  Date/Time: 8/18/2021 10:08 AM  Consent given by: patient  Timeout: Immediately prior to procedure a time out was called to verify the correct patient, procedure, equipment, support staff and site/side marked as required   Supporting Documentation  Indications: pain, joint swelling and diagnostic evaluation   Procedure Details  Location: knee - R knee  Preparation: Patient was prepped and draped in the usual sterile " fashion  Needle size: 22 G  Approach: anterolateral  Medications administered: 40 mg triamcinolone acetonide 40 MG/ML; 2 mL lidocaine 1 %  Patient tolerance: patient tolerated the procedure well with no immediate complications      PLAN    AP standing x-ray of the bilateral knees performed in office today reviewed with no acute findings noted.  Patient has moderate osteoarthritic changes in her bilateral knees with some narrowing of the medial joint spaces.  Patient complains of acute right knee pain since she sustained a fall on 7/24/2021.  Patient complains of pain in the anterior knee that seems to radiate into the proximal tibia at times.  We discussed the possibility of a nondisplaced fracture that is not visible on x-ray.  We discussed the possibility of exacerbation of her osteoarthritis due to the fall/impact.  We discussed the possibility of internal derangement, such as meniscal tearing.  We discussed possible need for MRI imaging for further evaluation.  For now, patient and daughter want to wait on MRI imaging and proceed with some conservative care.  Recommend trial of an intra-articular injection of steroid to the right knee today for management of joint pain/inflammation/swelling.    Recommend the following:    *Rest and activity modification as tolerated and based on pain.  *Modified weightbearing of the affected extremity with use of a cane or walker as needed.  Patient is using a quad cane in office today.  We discussed proper use of the cane in her left hand to modify weightbearing of her right knee.  *Gradual progression of weightbearing and activity as pain and swelling allow.  *Conditioning and strengthening exercises of the bilateral knees/legs.  *Elevation and ice therapy to the right knee as needed to minimize pain/swelling/inflammation.  *Tylenol, Aleve or Ibuprofen as needed for pain/discomfort.    Recommend a hinged knee brace to the right knee for added support since she complains that  "her knee \"gives out\" intermittently.  This is placed/fitted in office today.    Follow-up in 4 weeks for recheck.  Consider MRI imaging of the right knee if her pain/symptoms persist.  I have also discussed with the patient and her daughter today that they can notify me via telephone if her pain is not improving and they want to proceed with MRI imaging.    EMR Dragon/Transciption Disclaimer: Some of this note may be an electronic transcription/translation of spoken language to printed text.  The electronic translation of spoken language may permit erroneous, or at times, nonsensical words or phrases to be inadvertently transcribed. Although I have reviewed the note for such errors, some may still exist.     Return in about 4 weeks (around 9/15/2021) for Recheck.        This document has been electronically signed by ANN-MARIE Arrieta on August 18, 2021 13:36 CDT      ANN-MARIE Arrieta    "

## 2021-08-31 RX ORDER — EMPAGLIFLOZIN AND LINAGLIPTIN 25; 5 MG/1; MG/1
TABLET, FILM COATED ORAL
Qty: 30 TABLET | Refills: 5 | Status: SHIPPED | OUTPATIENT
Start: 2021-08-31

## 2021-09-22 RX ORDER — GLIPIZIDE 10 MG/1
10 TABLET ORAL DAILY
Qty: 90 TABLET | OUTPATIENT
Start: 2021-09-22

## 2021-09-22 RX ORDER — METFORMIN HYDROCHLORIDE 500 MG/1
TABLET, EXTENDED RELEASE ORAL
Qty: 360 TABLET | OUTPATIENT
Start: 2021-09-22

## 2021-09-22 RX ORDER — METFORMIN HYDROCHLORIDE 500 MG/1
TABLET, EXTENDED RELEASE ORAL
Qty: 120 TABLET | Refills: 0 | Status: SHIPPED | OUTPATIENT
Start: 2021-09-22 | End: 2021-10-25

## 2021-09-22 RX ORDER — GLIPIZIDE 10 MG/1
10 TABLET ORAL DAILY
Qty: 30 TABLET | Refills: 0 | Status: SHIPPED | OUTPATIENT
Start: 2021-09-22 | End: 2021-10-25

## 2021-10-25 ENCOUNTER — OFFICE VISIT (OUTPATIENT)
Dept: ORTHOPEDIC SURGERY | Facility: CLINIC | Age: 61
End: 2021-10-25

## 2021-10-25 VITALS — WEIGHT: 194.69 LBS | HEIGHT: 64 IN | BODY MASS INDEX: 33.24 KG/M2

## 2021-10-25 DIAGNOSIS — M23.91 INTERNAL DERANGEMENT OF RIGHT KNEE: ICD-10-CM

## 2021-10-25 DIAGNOSIS — M17.11 PRIMARY OSTEOARTHRITIS OF RIGHT KNEE: ICD-10-CM

## 2021-10-25 DIAGNOSIS — M25.561 ACUTE PAIN OF RIGHT KNEE: Primary | ICD-10-CM
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
## 2021-10-25 PROCEDURE — 20610 DRAIN/INJ JOINT/BURSA W/O US: CPT | Performed by: NURSE PRACTITIONER

## 2021-10-25 PROCEDURE — 99214 OFFICE O/P EST MOD 30 MIN: CPT | Performed by: NURSE PRACTITIONER

## 2021-10-25 RX ORDER — METFORMIN HYDROCHLORIDE 500 MG/1
TABLET, EXTENDED RELEASE ORAL
Qty: 60 TABLET | Refills: 0 | Status: SHIPPED | OUTPATIENT
Start: 2021-10-25 | End: 2022-01-19

## 2021-10-25 RX ORDER — GLIPIZIDE 10 MG/1
10 TABLET ORAL DAILY
Qty: 30 TABLET | Refills: 0 | Status: SHIPPED | OUTPATIENT
Start: 2021-10-25

## 2021-10-25 RX ADMIN — LIDOCAINE HYDROCHLORIDE 2 ML: 10 INJECTION, SOLUTION INFILTRATION; PERINEURAL at 15:58

## 2021-10-25 RX ADMIN — TRIAMCINOLONE ACETONIDE 40 MG: 40 INJECTION, SUSPENSION INTRA-ARTICULAR; INTRAMUSCULAR at 15:58

## 2021-10-25 NOTE — PROGRESS NOTES
"Katiana Jin is a 61 y.o. female returns for     Chief Complaint   Patient presents with   • Right Knee - Follow-up       HISTORY OF PRESENT ILLNESS: Patient presents to office accompanied by her daughter for follow-up of acute right knee pain/injury.  Initial injury occurred on 7/24/2021 when she fell/tripped while walking to a neighbor's house.  Patient establish care with orthopedics on 8/18/2021 and was given an intra-articular injection of steroid at that time, which she states resolved her right knee pain completely for several weeks.  Patient states the right knee pain returned approximately 2 weeks ago for unknown reasons.  Patient denies any new falls or injuries.  Patient continues to use a quad cane for modified weightbearing.  Patient has also previously tried use of a hinged knee brace, Tylenol and OTC oral NSAIDs with mild improvement only.  Patient localizes the pain to the medial aspect of her knee.  No locking or catching symptoms reported. No instability symptoms reported.  Pain scale today is 9/10.     CONCURRENT MEDICAL HISTORY:    The following portions of the patient's history were reviewed and updated as appropriate: allergies, current medications, past family history, past medical history, past social history, past surgical history and problem list.     ROS  No fevers or chills.  No chest pain or shortness of air.  No GI or  disturbances. Right knee pain.     PHYSICAL EXAMINATION:       Ht 162.6 cm (64\")   Wt 88.3 kg (194 lb 11 oz)   BMI 33.42 kg/m²     Physical Exam  Vitals reviewed.   Constitutional:       General: She is not in acute distress.     Appearance: She is well-developed. She is not ill-appearing.   HENT:      Head: Normocephalic.   Pulmonary:      Effort: Pulmonary effort is normal. No respiratory distress.   Abdominal:      General: There is no distension.      Palpations: Abdomen is soft.   Musculoskeletal:         General: Swelling (Mild, right knee) and tenderness " "(Mild, right knee) present. No deformity or signs of injury.      Right knee: No effusion.      Instability Tests: Medial Caesar test positive. Lateral Caesar test negative.   Skin:     General: Skin is warm and dry.      Capillary Refill: Capillary refill takes less than 2 seconds.      Findings: No erythema.   Neurological:      Mental Status: She is alert and oriented to person, place, and time.      GCS: GCS eye subscore is 4. GCS verbal subscore is 5. GCS motor subscore is 6.   Psychiatric:         Speech: Speech normal.         Behavior: Behavior normal.         Thought Content: Thought content normal.         Judgment: Judgment normal.         GAIT:     []  Normal  [x]  Antalgic    Assistive device: []  None  []  Walker     []  Crutches  [x]  Cane     []  Wheelchair  []  Stretcher    Right Knee Exam     Tenderness   The patient is experiencing tenderness in the medial joint line (Mild, diffuse).    Range of Motion   Extension: 0   Flexion: 110     Tests   Caesar:  Medial - positive Lateral - negative  Varus: negative Valgus: negative    Other   Erythema: absent  Sensation: normal  Pulse: present  Swelling: mild  Effusion: no effusion present              Standing AP knees     HISTORY: Right knee pain     Standing AP view of each knee obtained.     COMPARISON: None     FINDINGS:   6 mm and smaller bilateral loose bodies.  Bilateral moderate narrowing of each medial joint.  Small lateral femoral osteophytes bilaterally.  No fracture or dislocation as viewed in the AP projection.  No other osseous or articular abnormality.     IMPRESSION:  CONCLUSION:  6 mm and smaller bilateral loose bodies.  Bilateral moderate narrowing of each medial joint.  Small lateral femoral osteophytes bilaterally.     [id number]     Electronically signed by:  Luis Enrique Poe MD  8/18/2021 10:29 PM  CDT Workstation: [id number]      EXAM: XR KNEE 1-2 VIEWS     INDICATION: acute right knee pain, knee \"locking\", M25.561 Pain  in right " knee     COMPARISON: None     FINDINGS: Degenerative arthritis involving the right knee with  partial loss of medial compartment joint space and small  tricompartmental marginal hypertrophic osteophytes. No acute  fracture or dislocation.     No effusion.     IMPRESSION:  Mild/moderate degenerative arthritis involving the  right knee without acute bony abnormality.        Electronically signed by:  Josef Rivero MD  5/4/2021 7:33 AM CDT  Workstation: [id number]      Large Joint Arthrocentesis: R knee  Date/Time: 10/25/2021 3:58 PM  Consent given by: patient  Timeout: Immediately prior to procedure a time out was called to verify the correct patient, procedure, equipment, support staff and site/side marked as required   Supporting Documentation  Indications: pain, joint swelling and diagnostic evaluation   Procedure Details  Location: knee - R knee  Preparation: Patient was prepped and draped in the usual sterile fashion  Needle size: 22 G  Approach: anterolateral  Medications administered: 40 mg triamcinolone acetonide 40 MG/ML; 2 mL lidocaine 1 %  Patient tolerance: patient tolerated the procedure well with no immediate complications      ASSESSMENT:    Diagnoses and all orders for this visit:    Acute pain of right knee  -     Large Joint Arthrocentesis: R knee  -     MRI Knee Right Without Contrast; Future    Primary osteoarthritis of right knee  -     Large Joint Arthrocentesis: R knee  -     MRI Knee Right Without Contrast; Future    Injury due to fall, subsequent encounter  -     Large Joint Arthrocentesis: R knee  -     MRI Knee Right Without Contrast; Future    Internal derangement of right knee  -     Large Joint Arthrocentesis: R knee  -     MRI Knee Right Without Contrast; Future    PLAN    Patient complains of recurrent/persistent right knee pain that started acutely due to a fall on 7/24/2021. She does report complete resolution of the right knee pain for several weeks following a previous  intra-articular injection of steroid given on 8/18/2021.  We again discussed that she may have an underlying knee injury that is not visible on x-ray.  We again discussed proceeding with MRI imaging of the right knee for further evaluation and the patient is in agreement with this.  Recommend MRI of the right knee to evaluate for meniscal tear, ligament injury/insufficiency, bone contusion and/or osteochondral defect/injury/chondromalacia.  Recommend proceeding today with a repeat intra-articular injection of steroid to the right knee for management of joint pain/inflammation/swelling.    Recommend the following:    -Rest and activity modification as tolerated and based on pain.  -Modified weightbearing of the affected extremity with use of a cane or walker as needed.  Patient is using her quad cane in office today.  -Gradual progression of weightbearing and activity as pain and swelling allow.  -Conditioning and strengthening exercises of the bilateral knees/legs.  -Continue with use of a hinged knee brace for added support if this offers any benefit.  She was provided with a hinged knee brace at last office visit.  -Elevation and ice therapy to the affected knee to minimize pain/swelling/inflammation.   -Tylenol, Aleve or Ibuprofen as needed for pain/discomfort.    Follow up after MRI to discuss results and further treatment recommendations. Consider referral to physical therapy if not improving.     Time spent of a minimum of 30 minutes including the face to face evaluation, reviewing of medical history and prior medial records, reviewing of diagnostic studies, ordering additional tests, documentation, patient education and coordination of care.     EMR Dragon/Transciption Disclaimer: Some of this note may be an electronic transcription/translation of spoken language to printed text.  The electronic translation of spoken language may permit erroneous, or at times, nonsensical words or phrases to be inadvertently  transcribed. Although I have reviewed the note for such errors, some may still exist.     Return for follow up after MRI.        This document has been electronically signed by ANN-MARIE Arrieta on October 26, 2021 16:51 CDT      ANN-MARIE Arrieta

## 2021-10-26 RX ORDER — LIDOCAINE HYDROCHLORIDE 10 MG/ML
2 INJECTION, SOLUTION INFILTRATION; PERINEURAL
Status: COMPLETED | OUTPATIENT
Start: 2021-10-25 | End: 2021-10-25

## 2021-10-26 RX ORDER — TRIAMCINOLONE ACETONIDE 40 MG/ML
40 INJECTION, SUSPENSION INTRA-ARTICULAR; INTRAMUSCULAR
Status: COMPLETED | OUTPATIENT
Start: 2021-10-25 | End: 2021-10-25

## 2021-11-08 ENCOUNTER — HOSPITAL ENCOUNTER (OUTPATIENT)
Dept: MRI IMAGING | Facility: HOSPITAL | Age: 61
Discharge: HOME | End: 2021-11-08
Admitting: NURSE PRACTITIONER

## 2021-11-08 DIAGNOSIS — M17.11 PRIMARY OSTEOARTHRITIS OF RIGHT KNEE: ICD-10-CM

## 2021-11-08 DIAGNOSIS — M25.561 ACUTE PAIN OF RIGHT KNEE: ICD-10-CM

## 2021-11-08 DIAGNOSIS — M23.91 INTERNAL DERANGEMENT OF RIGHT KNEE: ICD-10-CM
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
## 2021-11-08 PROCEDURE — 73721 MRI JNT OF LWR EXTRE W/O DYE: CPT

## 2021-11-18 ENCOUNTER — OFFICE VISIT (OUTPATIENT)
Dept: ORTHOPEDIC SURGERY | Facility: CLINIC | Age: 61
End: 2021-11-18

## 2021-11-18 VITALS — HEIGHT: 64 IN | BODY MASS INDEX: 33.12 KG/M2 | WEIGHT: 194 LBS

## 2021-11-18 DIAGNOSIS — G89.29 CHRONIC PAIN OF RIGHT KNEE: Primary | ICD-10-CM

## 2021-11-18 DIAGNOSIS — M17.11 PRIMARY OSTEOARTHRITIS OF RIGHT KNEE: ICD-10-CM

## 2021-11-18 DIAGNOSIS — M23.300 DEGENERATIVE TEAR OF LATERAL MENISCUS OF RIGHT KNEE: ICD-10-CM

## 2021-11-18 DIAGNOSIS — M23.203 DEGENERATIVE TEAR OF MEDIAL MENISCUS OF RIGHT KNEE: ICD-10-CM

## 2021-11-18 DIAGNOSIS — M25.561 CHRONIC PAIN OF RIGHT KNEE: Primary | ICD-10-CM

## 2021-11-18 DIAGNOSIS — M94.261 CHONDROMALACIA OF RIGHT KNEE: ICD-10-CM

## 2021-11-18 PROCEDURE — 99214 OFFICE O/P EST MOD 30 MIN: CPT | Performed by: NURSE PRACTITIONER

## 2021-11-18 RX ORDER — MELOXICAM 7.5 MG/1
7.5 TABLET ORAL DAILY
COMMUNITY
Start: 2021-11-08

## 2021-11-18 NOTE — PROGRESS NOTES
"Katiana Jin is a 61 y.o. female returns for     Chief Complaint   Patient presents with   • Right Knee - Follow-up       HISTORY OF PRESENT ILLNESS: Patient presents to office for follow-up of acute right knee pain/injury and to discuss MRI results.  Initial injury occurred on 7/24/2021 when she fell/tripped while walking to a neighbor's house.  Patient previously localized the pain along the medial aspect of her right knee.  Patient established care with orthopedics on 8/18/2021.  She has been treated conservatively with 2 prior intra-articular injections of steroid given on 8/18/2021 and repeated on 10/25/2021.  Patient has experience significant pain relief with both intra-articular injections of steroid.  She is doing well at this time and currently denies any right knee pain or issues.  Additional conservative treatments have included use of a quad cane for modified weightbearing, use of a hinged knee brace for added knee support, Tylenol, OTC oral NSAIDs and prescription oral NSAIDs.  Since last office visit, patient has been prescribed Meloxicam 7.5 mg by another provider.  No new complaints or concerns noted since last office visit.  Current pain scale is 0/10.     CONCURRENT MEDICAL HISTORY:    The following portions of the patient's history were reviewed and updated as appropriate: allergies, current medications, past family history, past medical history, past social history, past surgical history and problem list.     ROS  No fevers or chills.  No chest pain or shortness of air.  No GI or  disturbances.    PHYSICAL EXAMINATION:       Ht 162.6 cm (64\")   Wt 88 kg (194 lb)   BMI 33.30 kg/m²     Physical Exam  Vitals reviewed.   Constitutional:       General: She is not in acute distress.     Appearance: She is well-developed. She is not ill-appearing.   HENT:      Head: Normocephalic.   Pulmonary:      Effort: Pulmonary effort is normal. No respiratory distress.   Abdominal:      General: There is " no distension.      Palpations: Abdomen is soft.   Musculoskeletal:         General: No swelling, tenderness, deformity or signs of injury.      Right knee: No effusion.      Instability Tests: Medial Caesar test positive and lateral Caesar test positive.   Skin:     General: Skin is warm and dry.      Capillary Refill: Capillary refill takes less than 2 seconds.      Findings: No erythema.   Neurological:      Mental Status: She is alert and oriented to person, place, and time.      GCS: GCS eye subscore is 4. GCS verbal subscore is 5. GCS motor subscore is 6.   Psychiatric:         Speech: Speech normal.         Behavior: Behavior normal.         Thought Content: Thought content normal.         Judgment: Judgment normal.         GAIT:     [x]  Normal  []  Antalgic    Assistive device: [x]  None  []  Walker     []  Crutches  []  Cane     []  Wheelchair  []  Stretcher    Right Knee Exam     Tenderness   The patient is experiencing no tenderness.     Range of Motion   Extension: 0   Flexion: 120     Tests   Caesar:  Medial - positive Lateral - positive  Varus: negative Valgus: negative    Other   Erythema: absent  Sensation: normal  Pulse: present  Swelling: none  Effusion: no effusion present            MRI Knee Right Without Contrast    Result Date: 11/8/2021  Narrative: MRI right knee. HISTORY: Persistent right knee pain. Trauma July 2021. Prior exam: Bilateral knees August 18, 2021. Technique: Multiplanar multisequence noncontrast images right knee. Small intra-articular and suprapatellar effusion. Small intra-articular loose bodies in the popliteal fossa. Degenerative osteoarthritic changes, grade IV chondromalacia (osteochondral defects )patellar articular hyaline cartilage and medial aspect of the medial femoral joint with joint space narrowing, loss of cartilage and grade IV chondromalacia (osteochondral defects.) Subtle degenerative type tear posterior horn medial meniscus. Anterior extrusion of the  "anterior horn often  seen with degenerative osteoarthritic changes. Subtle degenerative type tear anterior horn lateral meniscus. Normal posterior horn. Normal anterior and posterior cruciate ligaments     Impression: Degenerative osteoarthritic changes. Joint space narrowing, loss of cartilage and grade IV chondromalacia patellofemoral joint, articular surface of the patella and medial aspect tibiofemoral joint. Subtle degenerative type tear posterior horn medial meniscus. Subtle degenerative type tear anterior horn lateral meniscus. Small intra-articular and suprapatellar effusion. Intra-articular loose bodies in the popliteal fossa. Electronically signed by:  Ed Connors MD  11/8/2021 2:33 PM [facility] Workstation: [id number]    Standing AP knees     HISTORY: Right knee pain     Standing AP view of each knee obtained.     COMPARISON: None     FINDINGS:   6 mm and smaller bilateral loose bodies.  Bilateral moderate narrowing of each medial joint.  Small lateral femoral osteophytes bilaterally.  No fracture or dislocation as viewed in the AP projection.  No other osseous or articular abnormality.     IMPRESSION:  CONCLUSION:  6 mm and smaller bilateral loose bodies.  Bilateral moderate narrowing of each medial joint.  Small lateral femoral osteophytes bilaterally.     [id number]     Electronically signed by:  uLis Enrique Poe MD  8/18/2021 10:29 PM  CDT Workstation: [id number]        EXAM: XR KNEE 1-2 VIEWS     INDICATION: acute right knee pain, knee \"locking\", M25.561 Pain  in right knee     COMPARISON: None     FINDINGS: Degenerative arthritis involving the right knee with  partial loss of medial compartment joint space and small  tricompartmental marginal hypertrophic osteophytes. No acute  fracture or dislocation.     No effusion.     IMPRESSION:  Mild/moderate degenerative arthritis involving the  right knee without acute bony abnormality.        Electronically signed by:  Josef Rivero MD  5/4/2021 7:33 AM " CDT  Workstation: [id number]      ASSESSMENT:    Diagnoses and all orders for this visit:    Chronic pain of right knee    Primary osteoarthritis of right knee    Degenerative tear of lateral meniscus of right knee    Degenerative tear of medial meniscus of right knee    Chondromalacia of right knee    Other orders  -     meloxicam (MOBIC) 7.5 MG tablet; Take 7.5 mg by mouth Daily.  -     Diclofenac Sodium (VOLTAREN) 1 % gel gel; Apply 4 g topically to the appropriate area as directed 4 (Four) Times a Day.    PLAN    MRI of right knee reviewed and results are discussed with patient and family member.  We discussed evidence of degenerative osteoarthritic changes with joint space narrowing, loss of cartilage and grade IV chondromalacia, primarily at the medial aspect of the knee and patellofemoral aspect of the knee.  We discussed evidence of subtle degenerative type tears of both the posterior horn of the medial meniscus and anterior horn of the lateral meniscus.  No evidence of any acute injury or ligamentous injury.  No evidence of any bony injury, contusion or fracture noted.  There is no acute surgical indication noted at this time.  We discussed continued conservative treatment efforts and we also discussed the possibility of eventual knee arthroplasty if her pain/symptoms become unmanageable with conservative care.  Patient verbalized understanding of this.  Patient is doing well and is pleased with her improvement.  She is ambulating well in office today without difficulty and without any assistive device.     Recommend the following:    -Rest and activity modification as tolerated and based on pain.  -Modified weightbearing of the affected extremity with use of a cane or walker as needed.  -Gradual progression of weightbearing and activity as pain and swelling allow.  -Conditioning and strengthening exercises of the bilateral knees/legs.  -Elevation and ice therapy to the affected knee to minimize  pain/swelling/inflammation.   -Continue with Meloxicam 7.5 mg daily for consistent dosing of oral NSAIDs and control of joint pain/inflammation as previously prescribed by another provider.  Patient can also take Tylenol as needed for additional pain control.    Voltaren gel is also prescribed today at the patient/family member's request to use as needed for knee pain.    Currently, the patient denies any right knee pain or issues.  She is improved from prior exam following a repeat intra-articular injection of steroid.  We discussed that she can follow-up with orthopedics on an as-needed basis at this point.    Time spent of a minimum of 30 minutes including the face to face evaluation, reviewing of medical history and prior medial records, reviewing of diagnostic studies, prescription drug management, documentation, patient education and coordination of care.     EMR Dragon/Transciption Disclaimer: Some of this note may be an electronic transcription/translation of spoken language to printed text.  The electronic translation of spoken language may permit erroneous, or at times, nonsensical words or phrases to be inadvertently transcribed. Although I have reviewed the note for such errors, some may still exist.     Return if symptoms worsen or fail to improve.        This document has been electronically signed by ANN-MARIE Arrieta on November 18, 2021 11:35 CST      ANN-MARIE Arrieta

## 2022-01-19 PROCEDURE — U0003 INFECTIOUS AGENT DETECTION BY NUCLEIC ACID (DNA OR RNA); SEVERE ACUTE RESPIRATORY SYNDROME CORONAVIRUS 2 (SARS-COV-2) (CORONAVIRUS DISEASE [COVID-19]), AMPLIFIED PROBE TECHNIQUE, MAKING USE OF HIGH THROUGHPUT TECHNOLOGIES AS DESCRIBED BY CMS-2020-01-R: HCPCS | Performed by: FAMILY MEDICINE

## 2022-01-26 ENCOUNTER — TRANSCRIBE ORDERS (OUTPATIENT)
Dept: PODIATRY | Facility: CLINIC | Age: 62
End: 2022-01-26

## 2022-01-26 DIAGNOSIS — M79.671 RIGHT FOOT PAIN: Primary | ICD-10-CM

## 2022-02-14 ENCOUNTER — OFFICE VISIT (OUTPATIENT)
Dept: PODIATRY | Facility: CLINIC | Age: 62
End: 2022-02-14

## 2022-02-14 VITALS — OXYGEN SATURATION: 95 % | HEIGHT: 64 IN | BODY MASS INDEX: 31.07 KG/M2 | WEIGHT: 182 LBS | HEART RATE: 83 BPM

## 2022-02-14 DIAGNOSIS — M79.674 CHRONIC TOE PAIN, BILATERAL: ICD-10-CM

## 2022-02-14 DIAGNOSIS — M79.675 CHRONIC TOE PAIN, BILATERAL: ICD-10-CM

## 2022-02-14 DIAGNOSIS — B35.1 ONYCHOMYCOSIS: ICD-10-CM

## 2022-02-14 DIAGNOSIS — E11.42 TYPE 2 DIABETES MELLITUS WITH PERIPHERAL NEUROPATHY: ICD-10-CM

## 2022-02-14 DIAGNOSIS — E11.9 ENCOUNTER FOR DIABETIC FOOT EXAM: Primary | ICD-10-CM

## 2022-02-14 DIAGNOSIS — G89.29 CHRONIC TOE PAIN, BILATERAL: ICD-10-CM

## 2022-02-14 DIAGNOSIS — L84 CORNS: ICD-10-CM

## 2022-02-14 PROCEDURE — 11721 DEBRIDE NAIL 6 OR MORE: CPT | Performed by: PODIATRIST

## 2022-02-14 PROCEDURE — 99203 OFFICE O/P NEW LOW 30 MIN: CPT | Performed by: PODIATRIST

## 2022-02-14 PROCEDURE — 11056 PARNG/CUTG B9 HYPRKR LES 2-4: CPT | Performed by: PODIATRIST

## 2022-02-14 RX ORDER — HYDROXYZINE HYDROCHLORIDE 25 MG/1
25 TABLET, FILM COATED ORAL 3 TIMES DAILY PRN
COMMUNITY

## 2022-02-14 NOTE — PROGRESS NOTES
Katiana Jin  1960  61 y.o. female  PCP: Cindy Baldwin 11/9/22  BS: 145    Diabetic foot exam    02/14/2022    Chief Complaint   Patient presents with   • Left Foot - diabetic foot exam   • Right Foot - diabetic foot exam       History of Present Illness    Katiana Jin is a 61 y.o.female who presents to clinic today as a referral for diabetic foot exam and care.      Past Medical History:   Diagnosis Date   • Anxiety and depression    • Asthma    • Diabetes (HCC)    • Essential hypertension    • GERD (gastroesophageal reflux disease)    • Hyperlipidemia          Past Surgical History:   Procedure Laterality Date   • CHOLECYSTECTOMY     • TOTAL ABDOMINAL HYSTERECTOMY           Family History   Problem Relation Age of Onset   • Emphysema Mother    • Diabetes Father    • Lung cancer Father    • Stroke Brother    • Stomach cancer Maternal Grandmother        Allergies   Allergen Reactions   • Albuterol GI Intolerance       Social History     Socioeconomic History   • Marital status: Single   Tobacco Use   • Smoking status: Never Smoker   • Smokeless tobacco: Never Used   Substance and Sexual Activity   • Alcohol use: Never         Current Outpatient Medications   Medication Sig Dispense Refill   • atorvastatin (LIPITOR) 20 MG tablet Take 1 tablet by mouth Every Night. 90 tablet 3   • Cholecalciferol (Vitamin D3) 1.25 MG (16625 UT) capsule Take 50,000 Units by mouth 1 (One) Time Per Week.     • fenofibrate (TRICOR) 145 MG tablet Take 145 mg by mouth Daily.     • glipizide (GLUCOTROL) 10 MG tablet TAKE 1 TABLET BY MOUTH DAILY 30 tablet 0   • Glyxambi 25-5 MG tablet TAKE 1 TABLET BY MOUTH EVERY DAY 30 tablet 5   • hydrOXYzine (ATARAX) 25 MG tablet Take 25 mg by mouth 3 (Three) Times a Day As Needed for Itching.     • lisinopril (PRINIVIL,ZESTRIL) 5 MG tablet Take 1 tablet by mouth Daily. 90 tablet 3   • loratadine (Claritin) 10 MG tablet Take 1 tablet by mouth Daily. 30 tablet 11   • meloxicam (MOBIC) 7.5 MG  "tablet Take 7.5 mg by mouth Daily.     • metFORMIN (GLUCOPHAGE) 1000 MG tablet Take 1,000 mg by mouth 2 (Two) Times a Day With Meals.     • omeprazole (priLOSEC) 20 MG capsule Take 1 capsule by mouth Daily. 90 capsule 3   • venlafaxine XR (EFFEXOR-XR) 150 MG 24 hr capsule Take 1 capsule by mouth Daily. 90 capsule 3   • verapamil (CALAN) 120 MG tablet Take 1 tablet by mouth 2 (two) times a day. 1  To 2 times daily 180 tablet 3     No current facility-administered medications for this visit.       Review of Systems   Constitutional: Negative.    HENT: Negative.    Eyes: Negative.    Respiratory: Negative.    Cardiovascular: Negative.    Gastrointestinal: Negative.    Endocrine: Negative.    Genitourinary: Negative.    Musculoskeletal: Negative.    Skin: Negative.    Allergic/Immunologic: Negative.    Neurological: Negative.    Hematological: Negative.    Psychiatric/Behavioral: Negative.          OBJECTIVE    Pulse 83   Ht 162.6 cm (64\")   Wt 82.6 kg (182 lb)   SpO2 95%   BMI 31.24 kg/m²     Physical Exam  Vitals reviewed.   Constitutional:       General: She is not in acute distress.     Appearance: She is well-developed.   HENT:      Head: Normocephalic and atraumatic.      Nose: Nose normal.   Eyes:      Conjunctiva/sclera: Conjunctivae normal.      Pupils: Pupils are equal, round, and reactive to light.   Pulmonary:      Effort: Pulmonary effort is normal. No respiratory distress.      Breath sounds: No wheezing.   Musculoskeletal:         General: No deformity. Normal range of motion.   Skin:     General: Skin is warm and dry.      Capillary Refill: Capillary refill takes less than 2 seconds.   Neurological:      Mental Status: She is alert and oriented to person, place, and time.   Psychiatric:         Behavior: Behavior normal.         Thought Content: Thought content normal.         Lower Extremity Exam:     Cardiovascular:    DP pulse palpable bilateral  Pt pulse palpable bilateral  CFT brisk to all " digits bilateral  Pedal hair growth absent bilateral  No erythema or edema  Musculoskeletal:  Muscle strength is WNL bilateral  ROM of the 1st MTP is WNL bilateral  ROM of the ankle joint is WNL bilateral  No deformities bilateral  Dermatological:   Skin warm, dry and intact bilateral.  Skin diffusely dry bilateral  Webspaces 1-4 bilateral are clean, dry and intact.   No subcutaneous nodules or masses noted bilateral  Toenails 1 through 5 bilateral are thickened, discolored, elongated with subungual debris.  Pain on palpation to the nail plates.  Hyperkeratotic tissue noted to medial IPJ of the hallux bilateral  Neurological:   Protective sensation absent 3/10 sites bilateral  Sensation intact to light touch bilateral      Foot/Ankle Exam        Procedures        ASSESSMENT AND PLAN    Diagnoses and all orders for this visit:    1. Encounter for diabetic foot exam (HCC) (Primary)    2. Type 2 diabetes mellitus with peripheral neuropathy (HCC)    3. Onychomycosis    4. Chronic toe pain, bilateral    5. Corns        - A diabetic foot screening exam was performed and the patient was educated on the foot complications related to diabetes,  preventative foot care and what signs and symptoms to watch for.  Instructed to contact our office if any foot problems develop before next visit.  -Discussed treatments for  painful toenails. Treatment options discussed included nail removal versus debridement. Patient elected for routine nail debridement. An ABN has been signed by the patient.  - Toenails 1-5 bilateral were debrided in length and thickness with nail nipper and electric  to decrease fungal load and risk of infection.  - Trimmed hyperkeratotic lesions noted in objective with a #15 blade without incident.   - All the patients questions were answered.  - RTC 3 months or sooner if needed.              This document has been electronically signed by Gokul Nunez DPM on February 19, 2022 11:18 CST      2/19/2022  11:18 CST

## 2022-03-04 ENCOUNTER — OFFICE VISIT (OUTPATIENT)
Dept: ORTHOPEDIC SURGERY | Facility: CLINIC | Age: 62
End: 2022-03-04

## 2022-03-04 VITALS — HEIGHT: 64 IN | WEIGHT: 182 LBS | BODY MASS INDEX: 31.07 KG/M2

## 2022-03-04 DIAGNOSIS — M19.011 PRIMARY OSTEOARTHRITIS OF RIGHT SHOULDER: ICD-10-CM

## 2022-03-04 DIAGNOSIS — M75.101 ROTATOR CUFF SYNDROME OF RIGHT SHOULDER: ICD-10-CM

## 2022-03-04 DIAGNOSIS — G89.29 CHRONIC RIGHT SHOULDER PAIN: Primary | ICD-10-CM

## 2022-03-04 DIAGNOSIS — M25.511 CHRONIC RIGHT SHOULDER PAIN: Primary | ICD-10-CM

## 2022-03-04 DIAGNOSIS — M19.011 ARTHROSIS OF RIGHT ACROMIOCLAVICULAR JOINT: ICD-10-CM

## 2022-03-04 PROCEDURE — 20610 DRAIN/INJ JOINT/BURSA W/O US: CPT | Performed by: NURSE PRACTITIONER

## 2022-03-04 RX ADMIN — TRIAMCINOLONE ACETONIDE 40 MG: 40 INJECTION, SUSPENSION INTRA-ARTICULAR; INTRAMUSCULAR at 08:08

## 2022-03-04 RX ADMIN — LIDOCAINE HYDROCHLORIDE 2 ML: 10 INJECTION, SOLUTION INFILTRATION; PERINEURAL at 08:08

## 2022-03-04 NOTE — PROGRESS NOTES
"Katiana Jin is a 62 y.o. female returns for     Chief Complaint   Patient presents with   • Right Shoulder - Follow-up, Pain     HISTORY OF PRESENT ILLNESS: Patient presents to office accompanied by her family member for follow-up of chronic right shoulder pain.  Onset of pain occurred in the right shoulder approximately 3 years ago with no initial injury or incident.  Prior to moving back to [place] last year, the patient had been evaluated in the past while living in [place] and was told that there was some type of \"tear\" in her right shoulder.  There are no prior records available for me to review and is unclear if this is a definitive diagnosis or if she has had prior MRI imaging.  Patient has previously been treated with prescription oral NSAIDs and a subacromial injection of steroid given on 7/21/2021, which offered significant pain improvement for several months.  Patient states that her right shoulder pain has been gradually returning/worsening in recent weeks.  She denies any falls or injuries.  The patient is here today as she wants to proceed with a repeat injection to improve her pain.  No new complaints or concerns noted since last office visit.  Patient continues to take Meloxicam 7.5 mg daily as a regular medication.  Pain scale today is 9/10.     CONCURRENT MEDICAL HISTORY:    The following portions of the patient's history were reviewed and updated as appropriate: allergies, current medications, past family history, past medical history, past social history, past surgical history and problem list.     ROS  No fevers or chills.  No chest pain or shortness of air.  No GI or  disturbances.  Right shoulder pain.    PHYSICAL EXAMINATION:       Ht 162.6 cm (64\")   Wt 82.6 kg (182 lb)   BMI 31.24 kg/m²     Physical Exam  Vitals reviewed.   Constitutional:       General: She is not in acute distress.     Appearance: She is well-developed. She is not ill-appearing.   HENT:      Head: " Normocephalic.   Pulmonary:      Effort: Pulmonary effort is normal. No respiratory distress.   Abdominal:      General: There is no distension.      Palpations: Abdomen is soft.   Musculoskeletal:         General: Tenderness (Mild, right shoulder) present. No swelling, deformity or signs of injury.   Skin:     General: Skin is warm and dry.      Capillary Refill: Capillary refill takes less than 2 seconds.      Findings: No erythema.   Neurological:      Mental Status: She is alert and oriented to person, place, and time.      GCS: GCS eye subscore is 4. GCS verbal subscore is 5. GCS motor subscore is 6.      Sensory: No sensory deficit.   Psychiatric:         Speech: Speech normal.         Behavior: Behavior normal.         Thought Content: Thought content normal.         Judgment: Judgment normal.         GAIT:     []  Normal  [x]  Antalgic    Assistive device: [x]  None  []  Walker     []  Crutches  []  Cane     []  Wheelchair  []  Stretcher    Right Shoulder Exam     Tenderness   The patient is experiencing tenderness in the acromioclavicular joint (Mild, diffuse).    Range of Motion   Active abduction: 160   External rotation: 80   Forward flexion: 140   Internal rotation 90 degrees: 70     Muscle Strength   Abduction: 3/5   Supraspinatus: 3/5     Tests   Judd test: positive  Cross arm: positive  Impingement: positive  Drop arm: negative    Other   Erythema: absent  Sensation: normal  Pulse: present    Comments:  Weakness is present.  Empty can test positive.  Full can test positive.  No swelling appreciated.  Neurovascularly intact.            EXAM: XR SHOULDER 2 OR MORE VIEWS     INDICATION: right shoulder pain, fall back in march, M25.511 Pain  in right shoulder     COMPARISON: None     FINDINGS: The right shoulder is negative for acute fracture or  dislocation. Mild hypertrophic degenerative change about the  right glenohumeral and acromioclavicular joints. No  periarticular  calcification.     IMPRESSION:  Mild degenerative arthritis about the right shoulder  without acute bony abnormality.        Electronically signed by:  Josef Rivero MD  5/4/2021 7:33 AM CDT  Workstation: [id number]      ASSESSMENT:    Diagnoses and all orders for this visit:    Chronic right shoulder pain  -     Large Joint Arthrocentesis: R subacromial bursa    Rotator cuff syndrome of right shoulder  -     Large Joint Arthrocentesis: R subacromial bursa    Primary osteoarthritis of right shoulder  -     Large Joint Arthrocentesis: R subacromial bursa    Arthrosis of right acromioclavicular joint  -     Large Joint Arthrocentesis: R subacromial bursa    PLAN    Large Joint Arthrocentesis: R subacromial bursa  Date/Time: 3/4/2022 8:08 AM  Consent given by: patient  Timeout: Immediately prior to procedure a time out was called to verify the correct patient, procedure, equipment, support staff and site/side marked as required   Supporting Documentation  Indications: pain   Procedure Details  Location: shoulder - R subacromial bursa  Preparation: Patient was prepped and draped in the usual sterile fashion  Needle size: 22 G  Approach: posterior  Medications administered: 40 mg triamcinolone acetonide 40 MG/ML; 2 mL lidocaine 1 %  Patient tolerance: patient tolerated the procedure well with no immediate complications      Patient complains of gradually worsening/returning right shoulder pain.  This has been an ongoing issue for at least 3 years now with no initial injury or incident.  We again discussed possible need for MRI imaging of the right shoulder at some point to evaluate for rotator cuff tear and/or impingement.  At this time, the patient declines MRI imaging and wants to proceed with a repeat injection as these have offered good pain improvement in the past.  The patient has not had an injection in the right shoulder since July 2021.  Recommend proceeding today with a repeat subacromial  injection of steroid to the right shoulder for management of pain/inflammation.    Recommend the following:  -Rest and activity modification with avoidance of heavy lifting, pulling, tugging and repetitive motions for now.  -Gentle, progressive range of motion exercises with the right shoulder intermittently throughout the day as pain allows.  -Ice therapy to the right shoulder intermittently 3-4 times daily for 15 minutes at a time to minimize pain/inflammation.  -Continue with Meloxicam for management of joint pain/inflammation as previously prescribed by her PCP.  Patient can also take Tylenol as needed for additional pain control.  Patient also has Voltaren gel at home to use as needed for joint pain.    Follow up in 3 months for recheck as needed for any new, worsening or persistent symptoms. Follow up sooner as needed. Consider MRI imagining of the right shoulder for further evaluation and to evaluate for possible rotator cuff tear.  Consider referral to physical therapy.    EMR Dragon/Transciption Disclaimer: Some of this note may be an electronic transcription/translation of spoken language to printed text.  The electronic translation of spoken language may permit erroneous, or at times, nonsensical words or phrases to be inadvertently transcribed. Although I have reviewed the note for such errors, some may still exist.     Return in about 3 months (around 6/4/2022), or if symptoms worsen or fail to improve, for Recheck.        This document has been electronically signed by ANN-MARIE Arrieta on March 6, 2022 20:18 CST      ANN-MARIE Arrieta

## 2022-03-06 RX ORDER — LIDOCAINE HYDROCHLORIDE 10 MG/ML
2 INJECTION, SOLUTION INFILTRATION; PERINEURAL
Status: COMPLETED | OUTPATIENT
Start: 2022-03-04 | End: 2022-03-04

## 2022-03-06 RX ORDER — TRIAMCINOLONE ACETONIDE 40 MG/ML
40 INJECTION, SUSPENSION INTRA-ARTICULAR; INTRAMUSCULAR
Status: COMPLETED | OUTPATIENT
Start: 2022-03-04 | End: 2022-03-04

## 2022-05-31 ENCOUNTER — OFFICE VISIT (OUTPATIENT)
Dept: PODIATRY | Facility: CLINIC | Age: 62
End: 2022-05-31

## 2022-05-31 VITALS — HEART RATE: 65 BPM | BODY MASS INDEX: 31.07 KG/M2 | OXYGEN SATURATION: 98 % | HEIGHT: 64 IN | WEIGHT: 182 LBS

## 2022-05-31 DIAGNOSIS — G89.29 CHRONIC TOE PAIN, BILATERAL: ICD-10-CM

## 2022-05-31 DIAGNOSIS — L84 CORNS: ICD-10-CM

## 2022-05-31 DIAGNOSIS — M79.675 CHRONIC TOE PAIN, BILATERAL: ICD-10-CM

## 2022-05-31 DIAGNOSIS — B35.1 ONYCHOMYCOSIS: ICD-10-CM

## 2022-05-31 DIAGNOSIS — M79.674 CHRONIC TOE PAIN, BILATERAL: ICD-10-CM

## 2022-05-31 DIAGNOSIS — E11.42 TYPE 2 DIABETES MELLITUS WITH PERIPHERAL NEUROPATHY: Primary | ICD-10-CM

## 2022-05-31 PROCEDURE — 11056 PARNG/CUTG B9 HYPRKR LES 2-4: CPT | Performed by: PODIATRIST

## 2022-05-31 PROCEDURE — 11721 DEBRIDE NAIL 6 OR MORE: CPT | Performed by: PODIATRIST

## 2022-05-31 NOTE — PROGRESS NOTES
Katiana Jin  1960  62 y.o. female  PCP: ELISA Baldwin 04/20/2022  A1C: 10.8 April 2022April 2022 05/31/2022    Chief Complaint   Patient presents with   • Left Foot - Follow-up     Diabetic foot care   • Right Foot - Follow-up     Diabetic foot care       History of Present Illness    Katiana Jin is a 62 y.o.female who presents to clinic today for diabetic foot care.      Past Medical History:   Diagnosis Date   • Anxiety and depression    • Asthma    • Diabetes (HCC)    • Essential hypertension    • GERD (gastroesophageal reflux disease)    • Hyperlipidemia          Past Surgical History:   Procedure Laterality Date   • CHOLECYSTECTOMY     • TOTAL ABDOMINAL HYSTERECTOMY           Family History   Problem Relation Age of Onset   • Emphysema Mother    • Diabetes Father    • Lung cancer Father    • Stroke Brother    • Stomach cancer Maternal Grandmother        Allergies   Allergen Reactions   • Albuterol GI Intolerance       Social History     Socioeconomic History   • Marital status: Single   Tobacco Use   • Smoking status: Never Smoker   • Smokeless tobacco: Never Used   Substance and Sexual Activity   • Alcohol use: Never         Current Outpatient Medications   Medication Sig Dispense Refill   • atorvastatin (LIPITOR) 20 MG tablet Take 1 tablet by mouth Every Night. 90 tablet 3   • Cholecalciferol (Vitamin D3) 1.25 MG (95884 UT) capsule Take 50,000 Units by mouth 1 (One) Time Per Week.     • fenofibrate (TRICOR) 145 MG tablet Take 145 mg by mouth Daily.     • glipizide (GLUCOTROL) 10 MG tablet TAKE 1 TABLET BY MOUTH DAILY 30 tablet 0   • Glyxambi 25-5 MG tablet TAKE 1 TABLET BY MOUTH EVERY DAY 30 tablet 5   • hydrOXYzine (ATARAX) 25 MG tablet Take 25 mg by mouth 3 (Three) Times a Day As Needed for Itching.     • lisinopril (PRINIVIL,ZESTRIL) 5 MG tablet Take 1 tablet by mouth Daily. 90 tablet 3   • loratadine (Claritin) 10 MG tablet Take 1 tablet by mouth Daily. 30 tablet 11   • meloxicam (MOBIC) 7.5 MG  "tablet Take 7.5 mg by mouth Daily.     • metFORMIN (GLUCOPHAGE) 1000 MG tablet Take 1,000 mg by mouth 2 (Two) Times a Day With Meals.     • omeprazole (priLOSEC) 20 MG capsule Take 1 capsule by mouth Daily. 90 capsule 3   • venlafaxine XR (EFFEXOR-XR) 150 MG 24 hr capsule Take 1 capsule by mouth Daily. 90 capsule 3   • verapamil (CALAN) 120 MG tablet Take 1 tablet by mouth 2 (two) times a day. 1  To 2 times daily 180 tablet 3     No current facility-administered medications for this visit.       Review of Systems   Constitutional: Negative.    HENT: Negative.    Eyes: Negative.    Respiratory: Negative.    Cardiovascular: Negative.    Gastrointestinal: Negative.    Endocrine: Negative.    Genitourinary: Negative.    Musculoskeletal: Negative.    Skin: Negative.    Allergic/Immunologic: Negative.    Neurological: Negative.    Hematological: Negative.    Psychiatric/Behavioral: Negative.          OBJECTIVE    Pulse 65   Ht 162.6 cm (64\")   Wt 82.6 kg (182 lb)   SpO2 98%   BMI 31.24 kg/m²     Physical Exam  Vitals reviewed.   Constitutional:       General: She is not in acute distress.     Appearance: She is well-developed.   HENT:      Head: Normocephalic and atraumatic.      Nose: Nose normal.   Eyes:      Conjunctiva/sclera: Conjunctivae normal.      Pupils: Pupils are equal, round, and reactive to light.   Pulmonary:      Effort: Pulmonary effort is normal. No respiratory distress.      Breath sounds: No wheezing.   Musculoskeletal:         General: No deformity. Normal range of motion.   Skin:     General: Skin is warm and dry.      Capillary Refill: Capillary refill takes less than 2 seconds.   Neurological:      Mental Status: She is alert and oriented to person, place, and time.   Psychiatric:         Behavior: Behavior normal.         Thought Content: Thought content normal.         Lower Extremity Exam:     Cardiovascular:    DP pulse palpable bilateral  Pt pulse palpable bilateral  CFT brisk to all " digits bilateral  Pedal hair growth absent bilateral  No erythema or edema  Musculoskeletal:  Muscle strength is WNL bilateral  ROM of the 1st MTP is WNL bilateral  ROM of the ankle joint is WNL bilateral  No deformities bilateral  Dermatological:   Skin warm, dry and intact bilateral.  Skin diffusely dry bilateral  Webspaces 1-4 bilateral are clean, dry and intact.   No subcutaneous nodules or masses noted bilateral  Toenails 1 through 5 bilateral are thickened, discolored, elongated with subungual debris.  Pain on palpation to the nail plates.  Hyperkeratotic tissue noted to medial IPJ of the hallux bilateral  Neurological:   Protective sensation absent 3/10 sites bilateral  Sensation intact to light touch bilateral      Foot/Ankle Exam        Procedures        ASSESSMENT AND PLAN    Diagnoses and all orders for this visit:    1. Type 2 diabetes mellitus with peripheral neuropathy (HCC) (Primary)    2. Onychomycosis    3. Chronic toe pain, bilateral    4. Corns        - Toenails 1-5 bilateral were debrided in length and thickness with nail nipper and electric  to decrease fungal load and risk of infection.  ABN signed by the patient prior to treatment.  - Trimmed hyperkeratotic lesions noted in objective with a #15 blade without incident.   - All the patients questions were answered.  - RTC 3 months or sooner if needed.              This document has been electronically signed by Gokul Nunez DPM on May 31, 2022 12:19 CDT     5/31/2022  12:19 CDT

## 2022-12-16 ENCOUNTER — OFFICE VISIT (OUTPATIENT)
Dept: GASTROENTEROLOGY | Facility: CLINIC | Age: 62
End: 2022-12-16

## 2022-12-16 VITALS
WEIGHT: 192.6 LBS | HEIGHT: 64 IN | DIASTOLIC BLOOD PRESSURE: 62 MMHG | SYSTOLIC BLOOD PRESSURE: 124 MMHG | HEART RATE: 97 BPM | BODY MASS INDEX: 32.88 KG/M2

## 2022-12-16 DIAGNOSIS — R13.19 OTHER DYSPHAGIA: Primary | ICD-10-CM

## 2022-12-16 PROCEDURE — 99204 OFFICE O/P NEW MOD 45 MIN: CPT | Performed by: INTERNAL MEDICINE

## 2022-12-16 NOTE — PROGRESS NOTES
[facility]      Chief Complaint:   Chief Complaint   Patient presents with   • Difficulty Swallowing       Subjective     HPI:   Patient with dysphagia.  Patient has had EGD with dilatations in the past many years ago.  Patient denies any nausea or vomiting.  Patient does have worsening of dysphagia and stated her pills are having trouble going down.  Patient has never had surgery on her stomach or esophagus.  Patient has had history of cholecystectomy.  Patient is due for screening colonoscopy we will discuss this with her on follow-up after EGD with dilatation    Plan; we will schedule patient for EGD with dilatation.    Past Medical History:   Past Medical History:   Diagnosis Date   • Anxiety and depression    • Asthma    • Diabetes (HCC)    • Essential hypertension    • GERD (gastroesophageal reflux disease)    • Hyperlipidemia        Past Surgical History:    Past Surgical History:   Procedure Laterality Date   • CHOLECYSTECTOMY     • TOTAL ABDOMINAL HYSTERECTOMY         Family History:  Family History   Problem Relation Age of Onset   • Emphysema Mother    • Diabetes Father    • Lung cancer Father    • Stroke Brother    • Stomach cancer Maternal Grandmother        Social History:   reports that she has never smoked. She has never used smokeless tobacco. She reports that she does not drink alcohol.    Medications:   Prior to Admission medications    Medication Sig Start Date End Date Taking? Authorizing Provider   atorvastatin (LIPITOR) 20 MG tablet Take 1 tablet by mouth Every Night. 5/12/21  Yes Veena Simmons APRN   Cholecalciferol (Vitamin D3) 1.25 MG (05177 UT) capsule Take 50,000 Units by mouth 1 (One) Time Per Week. 10/26/21  Yes Emergency, Nurse Jazmín RN   fenofibrate (TRICOR) 145 MG tablet Take 145 mg by mouth Daily. 10/26/21  Yes Emergency, Nurse Jazmín RN   glipizide (GLUCOTROL) 10 MG tablet TAKE 1 TABLET BY MOUTH DAILY 10/25/21  Yes Veena Simmons  "ANN-MARIE   Glyxambi 25-5 MG tablet TAKE 1 TABLET BY MOUTH EVERY DAY 8/31/21  Yes Vincent Bautista MD   hydrOXYzine (ATARAX) 25 MG tablet Take 25 mg by mouth 3 (Three) Times a Day As Needed for Itching.   Yes Provider, MD Aldair   lisinopril (PRINIVIL,ZESTRIL) 5 MG tablet Take 1 tablet by mouth Daily. 5/12/21  Yes Veena Simmons APRN   loratadine (Claritin) 10 MG tablet Take 1 tablet by mouth Daily. 4/12/21  Yes Veena Simmons APRN   meloxicam (MOBIC) 7.5 MG tablet Take 7.5 mg by mouth Daily. 11/8/21  Yes ProviderAldair MD   metFORMIN (GLUCOPHAGE) 1000 MG tablet Take 1,000 mg by mouth 2 (Two) Times a Day With Meals. 10/26/21  Yes Emergency, Nurse Epic, RN   omeprazole (priLOSEC) 20 MG capsule Take 1 capsule by mouth Daily. 5/12/21  Yes Veena Simmons APRN   venlafaxine XR (EFFEXOR-XR) 150 MG 24 hr capsule Take 1 capsule by mouth Daily. 5/13/21  Yes Veena Simmons APRN   verapamil (CALAN) 120 MG tablet Take 1 tablet by mouth 2 (two) times a day. 1  To 2 times daily 5/12/21  Yes Veena Simmons APRN       Allergies:  Albuterol    ROS:    Review of Systems   Constitutional: Negative for activity change, appetite change, chills, diaphoresis, fatigue, fever and unexpected weight change.   HENT: Negative for sore throat and trouble swallowing.    Respiratory: Negative for shortness of breath.    Gastrointestinal: Negative for abdominal distention, abdominal pain, anal bleeding, blood in stool, constipation, diarrhea, nausea, rectal pain and vomiting.   Endocrine: Negative for polydipsia, polyphagia and polyuria.   Genitourinary: Negative for difficulty urinating.   Musculoskeletal: Negative for arthralgias.   Skin: Negative for pallor.   Allergic/Immunologic: Negative for food allergies.   Neurological: Negative for weakness and light-headedness.   Psychiatric/Behavioral: Negative for behavioral problems.     Objective     Blood pressure 124/62, pulse 97, height 162.6 cm (64\"), weight 87.4 kg (192 lb " 9.6 oz).    Physical Exam  Constitutional:       General: She is not in acute distress.     Appearance: She is well-developed. She is not diaphoretic.   HENT:      Head: Normocephalic and atraumatic.   Cardiovascular:      Rate and Rhythm: Normal rate and regular rhythm.      Heart sounds: Normal heart sounds. No murmur heard.    No friction rub. No gallop.   Pulmonary:      Effort: No respiratory distress.      Breath sounds: Normal breath sounds. No wheezing or rales.   Chest:      Chest wall: No tenderness.   Abdominal:      General: Bowel sounds are normal. There is no distension.      Palpations: Abdomen is soft. There is no mass.      Tenderness: There is no abdominal tenderness. There is no guarding or rebound.      Hernia: No hernia is present.   Musculoskeletal:         General: Normal range of motion.   Skin:     General: Skin is warm and dry.      Coloration: Skin is not pale.      Findings: No erythema or rash.   Neurological:      Mental Status: She is alert and oriented to person, place, and time.   Psychiatric:         Behavior: Behavior normal.         Thought Content: Thought content normal.         Judgment: Judgment normal.          Assessment & Plan   Diagnoses and all orders for this visit:    1. Other dysphagia (Primary)        * Surgery not found *     Diagnosis Plan   1. Other dysphagia            Anticipated Surgical Procedure:  No orders of the defined types were placed in this encounter.      The risks, benefits, and alternatives of this procedure have been discussed with the patient or the responsible party- the patient understands and agrees to proceed.

## 2023-01-11 ENCOUNTER — HOSPITAL ENCOUNTER (OUTPATIENT)
Dept: GASTROENTEROLOGY | Facility: HOSPITAL | Age: 63
Discharge: HOME | End: 2023-01-11
Admitting: INTERNAL MEDICINE
Payer: MEDICARE

## 2023-01-11 DIAGNOSIS — R13.19 OTHER DYSPHAGIA: ICD-10-CM

## 2023-01-11 PROCEDURE — 91010 ESOPHAGUS MOTILITY STUDY: CPT

## 2023-01-11 NOTE — NURSING NOTE
Time in Room:  0748    B/P:  112/61    Heart Rate:  80    Respirations:  18    O2 Sat %:  95    Temp:  97.6    NPO Status:  0700 sips of water    Pain:  0          If yes, location and VAS:      Patient arrived to the Endoscopy department ambulatory alert and oriented.  Procedure  Explained to patient, patient verbalized understanding.  Consent obtained.     2% Lidocaine jelly used to numb _R__ nare.  Probe inserted into _R_ nare to _48 cm.   Patient tolerated well.     Procedure start time:  0753  Procedure stop time:  0809    Study complete, probe removed intact.  Patient tolerated well.      B/P:  130/70    Heart Rate: 91    Respirations:  20    O2 Sat %:  94      Patient Discharged to home time:  0812

## 2023-01-26 ENCOUNTER — OFFICE VISIT (OUTPATIENT)
Dept: GASTROENTEROLOGY | Facility: CLINIC | Age: 63
End: 2023-01-26
Payer: MEDICARE

## 2023-01-26 VITALS
BODY MASS INDEX: 33.05 KG/M2 | SYSTOLIC BLOOD PRESSURE: 107 MMHG | DIASTOLIC BLOOD PRESSURE: 74 MMHG | WEIGHT: 193.6 LBS | HEART RATE: 82 BPM | HEIGHT: 64 IN

## 2023-01-26 DIAGNOSIS — Z12.11 ENCOUNTER FOR SCREENING FOR MALIGNANT NEOPLASM OF COLON: Primary | ICD-10-CM

## 2023-01-26 DIAGNOSIS — R13.19 OTHER DYSPHAGIA: ICD-10-CM

## 2023-01-26 PROCEDURE — 99214 OFFICE O/P EST MOD 30 MIN: CPT | Performed by: INTERNAL MEDICINE

## 2023-01-26 RX ORDER — DEXTROSE AND SODIUM CHLORIDE 5; .45 G/100ML; G/100ML
30 INJECTION, SOLUTION INTRAVENOUS CONTINUOUS PRN
Status: CANCELLED | OUTPATIENT
Start: 2023-01-26

## 2023-01-26 RX ORDER — SODIUM, POTASSIUM,MAG SULFATES 17.5-3.13G
1 SOLUTION, RECONSTITUTED, ORAL ORAL EVERY 12 HOURS
Qty: 354 ML | Refills: 0 | Status: SHIPPED | OUTPATIENT
Start: 2023-01-26

## 2023-01-26 NOTE — PROGRESS NOTES
[facility]      Chief Complaint:   Chief Complaint   Patient presents with   • Difficulty Swallowing     Endo follow up       Subjective     HPI:   Patient with dysphagia.  Patient had a motility study which shows a peristalsis throughout the esophagus with swallowing.  Patient has multiple failed swallows but at this time patient states her swallowing is okay and she is able to take her medications and eat food.  Discussed with patient that she may need dilatations in the future if her swallowing becomes more difficult.  Patient is due for a screening colonoscopy we will schedule her for colonoscopy.    Plan; we will schedule patient for colonoscopy.  We will have patient follow-up in 3 months continue with evaluation for when patient will need dilatations    Past Medical History:   Past Medical History:   Diagnosis Date   • Anxiety and depression    • Asthma    • Diabetes (HCC)    • Essential hypertension    • GERD (gastroesophageal reflux disease)    • Hyperlipidemia        Past Surgical History:    Past Surgical History:   Procedure Laterality Date   • CHOLECYSTECTOMY     • TOTAL ABDOMINAL HYSTERECTOMY         Family History:  Family History   Problem Relation Age of Onset   • Emphysema Mother    • Diabetes Father    • Lung cancer Father    • Stroke Brother    • Stomach cancer Maternal Grandmother        Social History:   reports that she has never smoked. She has never used smokeless tobacco. She reports that she does not drink alcohol.    Medications:   Prior to Admission medications    Medication Sig Start Date End Date Taking? Authorizing Provider   atorvastatin (LIPITOR) 20 MG tablet Take 1 tablet by mouth Every Night. 5/12/21  Yes Veena Simmons APRN   Cholecalciferol (Vitamin D3) 1.25 MG (59743 UT) capsule Take 50,000 Units by mouth 1 (One) Time Per Week. 10/26/21  Yes Emergency, Nurse Jazmín, RN   fenofibrate (TRICOR) 145 MG tablet Take 145 mg by mouth Daily.  10/26/21  Yes Emergency, Nurse Jazmín, RN   glipizide (GLUCOTROL) 10 MG tablet TAKE 1 TABLET BY MOUTH DAILY 10/25/21  Yes Veena Simmons APRN   Glyxambi 25-5 MG tablet TAKE 1 TABLET BY MOUTH EVERY DAY 8/31/21  Yes Vincent Bautista MD   hydrOXYzine (ATARAX) 25 MG tablet Take 25 mg by mouth 3 (Three) Times a Day As Needed for Itching.   Yes ProviderAldair MD   lisinopril (PRINIVIL,ZESTRIL) 5 MG tablet Take 1 tablet by mouth Daily. 5/12/21  Yes Veena Simmons APRN   loratadine (Claritin) 10 MG tablet Take 1 tablet by mouth Daily. 4/12/21  Yes Veena Simmons APRN   meloxicam (MOBIC) 7.5 MG tablet Take 7.5 mg by mouth Daily. 11/8/21  Yes ProviderAldair MD   metFORMIN (GLUCOPHAGE) 1000 MG tablet Take 1,000 mg by mouth 2 (Two) Times a Day With Meals. 10/26/21  Yes Emergency, Nurse Epic, RN   omeprazole (priLOSEC) 20 MG capsule Take 1 capsule by mouth Daily. 5/12/21  Yes Veena Simmons APRN   venlafaxine XR (EFFEXOR-XR) 150 MG 24 hr capsule Take 1 capsule by mouth Daily. 5/13/21  Yes Veena Simmons APRN   verapamil (CALAN) 120 MG tablet Take 1 tablet by mouth 2 (two) times a day. 1  To 2 times daily 5/12/21  Yes Veena Simmons APRN   sodium-potassium-magnesium sulfates (Suprep Bowel Prep Kit) 17.5-3.13-1.6 GM/177ML solution oral solution Take 1 bottle by mouth Every 12 (Twelve) Hours. 1/26/23   Krish Alford MD       Allergies:  Albuterol    ROS:    Review of Systems   Constitutional: Negative for activity change, appetite change, chills, diaphoresis, fatigue, fever and unexpected weight change.   HENT: Negative for sore throat and trouble swallowing.    Respiratory: Negative for shortness of breath.    Gastrointestinal: Negative for abdominal distention, abdominal pain, anal bleeding, blood in stool, constipation, diarrhea, nausea, rectal pain and vomiting.   Endocrine: Negative for polydipsia, polyphagia and polyuria.   Genitourinary: Negative for difficulty urinating.   Musculoskeletal:  "Negative for arthralgias.   Skin: Negative for pallor.   Allergic/Immunologic: Negative for food allergies.   Neurological: Negative for weakness and light-headedness.   Psychiatric/Behavioral: Negative for behavioral problems.     Objective     Blood pressure 107/74, pulse 82, height 162.6 cm (64\"), weight 87.8 kg (193 lb 9.6 oz).    Physical Exam  Constitutional:       General: She is not in acute distress.     Appearance: She is well-developed. She is not diaphoretic.   HENT:      Head: Normocephalic and atraumatic.   Cardiovascular:      Rate and Rhythm: Normal rate and regular rhythm.      Heart sounds: Normal heart sounds. No murmur heard.    No friction rub. No gallop.   Pulmonary:      Effort: No respiratory distress.      Breath sounds: Normal breath sounds. No wheezing or rales.   Chest:      Chest wall: No tenderness.   Abdominal:      General: Bowel sounds are normal. There is no distension.      Palpations: Abdomen is soft. There is no mass.      Tenderness: There is no abdominal tenderness. There is no guarding or rebound.      Hernia: No hernia is present.   Musculoskeletal:         General: Normal range of motion.   Skin:     General: Skin is warm and dry.      Coloration: Skin is not pale.      Findings: No erythema or rash.   Neurological:      Mental Status: She is alert and oriented to person, place, and time.   Psychiatric:         Behavior: Behavior normal.         Thought Content: Thought content normal.         Judgment: Judgment normal.          Assessment & Plan   Diagnoses and all orders for this visit:    1. Encounter for screening for malignant neoplasm of colon (Primary)  -     Case Request; Standing  -     dextrose 5 % and sodium chloride 0.45 % infusion  -     Case Request    2. Other dysphagia    Other orders  -     Follow Anesthesia Guidelines / Protocol; Future  -     Obtain Informed Consent; Future  -     Implement Anesthesia Orders Day of Procedure; Standing  -     Obtain " Informed Consent; Standing  -     POC Glucose Once; Standing  -     Insert Peripheral IV; Standing  -     sodium-potassium-magnesium sulfates (Suprep Bowel Prep Kit) 17.5-3.13-1.6 GM/177ML solution oral solution; Take 1 bottle by mouth Every 12 (Twelve) Hours.  Dispense: 354 mL; Refill: 0        COLONOSCOPY (N/A)     Diagnosis Plan   1. Encounter for screening for malignant neoplasm of colon  Case Request    dextrose 5 % and sodium chloride 0.45 % infusion    Case Request      2. Other dysphagia            Anticipated Surgical Procedure:  Orders Placed This Encounter   Procedures   • Obtain Informed Consent     Standing Status:   Future     Order Specific Question:   Informed Consent Given For     Answer:   colonoscopy       The risks, benefits, and alternatives of this procedure have been discussed with the patient or the responsible party- the patient understands and agrees to proceed.

## 2023-02-15 ENCOUNTER — TRANSCRIBE ORDERS (OUTPATIENT)
Dept: PODIATRY | Facility: CLINIC | Age: 63
End: 2023-02-15
Payer: MEDICARE

## 2023-02-15 DIAGNOSIS — E11.9 ENCOUNTER FOR DIABETIC FOOT EXAM: Primary | ICD-10-CM

## 2023-02-28 ENCOUNTER — OFFICE VISIT (OUTPATIENT)
Dept: PODIATRY | Facility: CLINIC | Age: 63
End: 2023-02-28
Payer: MEDICARE

## 2023-02-28 VITALS
WEIGHT: 193 LBS | SYSTOLIC BLOOD PRESSURE: 106 MMHG | BODY MASS INDEX: 32.95 KG/M2 | DIASTOLIC BLOOD PRESSURE: 64 MMHG | HEIGHT: 64 IN | HEART RATE: 76 BPM | OXYGEN SATURATION: 98 %

## 2023-02-28 DIAGNOSIS — E11.9 ENCOUNTER FOR DIABETIC FOOT EXAM: ICD-10-CM

## 2023-02-28 PROCEDURE — 99212 OFFICE O/P EST SF 10 MIN: CPT | Performed by: NURSE PRACTITIONER

## 2023-02-28 PROCEDURE — 11721 DEBRIDE NAIL 6 OR MORE: CPT | Performed by: NURSE PRACTITIONER

## 2023-02-28 NOTE — PROGRESS NOTES
Katiana Jin  1960  63 y.o. female   PCP: Cindy Baldwin: 02/14/2023  A1C: 9.9        02/28/2023    Chief Complaint   Patient presents with   • Right Foot - Follow-up     Diabetic foot care   • Left Foot - Follow-up     Diabetic foot care       History of Present Illness    Katiana Jin is a 63 y.o.female presents to the clinic today for diabetic foot care.       Past Medical History:   Diagnosis Date   • Anxiety and depression    • Asthma    • Diabetes (HCC)    • Essential hypertension    • GERD (gastroesophageal reflux disease)    • Hyperlipidemia          Past Surgical History:   Procedure Laterality Date   • CHOLECYSTECTOMY     • TOTAL ABDOMINAL HYSTERECTOMY           Family History   Problem Relation Age of Onset   • Emphysema Mother    • Diabetes Father    • Lung cancer Father    • Stroke Brother    • Stomach cancer Maternal Grandmother        Allergies   Allergen Reactions   • Albuterol GI Intolerance       Social History     Socioeconomic History   • Marital status: Single   Tobacco Use   • Smoking status: Never   • Smokeless tobacco: Never   Substance and Sexual Activity   • Alcohol use: Never         Current Outpatient Medications   Medication Sig Dispense Refill   • atorvastatin (LIPITOR) 20 MG tablet Take 1 tablet by mouth Every Night. 90 tablet 3   • Cholecalciferol (Vitamin D3) 1.25 MG (25384 UT) capsule Take 1 capsule by mouth 1 (One) Time Per Week.     • fenofibrate (TRICOR) 145 MG tablet Take 1 tablet by mouth Daily.     • glipizide (GLUCOTROL) 10 MG tablet TAKE 1 TABLET BY MOUTH DAILY 30 tablet 0   • Glyxambi 25-5 MG tablet TAKE 1 TABLET BY MOUTH EVERY DAY 30 tablet 5   • hydrOXYzine (ATARAX) 25 MG tablet Take 1 tablet by mouth 3 (Three) Times a Day As Needed for Itching.     • lisinopril (PRINIVIL,ZESTRIL) 5 MG tablet Take 1 tablet by mouth Daily. 90 tablet 3   • loratadine (Claritin) 10 MG tablet Take 1 tablet by mouth Daily. 30 tablet 11   • meloxicam (MOBIC) 7.5 MG tablet Take 1  "tablet by mouth Daily.     • metFORMIN (GLUCOPHAGE) 1000 MG tablet Take 1 tablet by mouth 2 (Two) Times a Day With Meals.     • omeprazole (priLOSEC) 20 MG capsule Take 1 capsule by mouth Daily. 90 capsule 3   • sodium-potassium-magnesium sulfates (Suprep Bowel Prep Kit) 17.5-3.13-1.6 GM/177ML solution oral solution Take 1 bottle by mouth Every 12 (Twelve) Hours. 354 mL 0   • venlafaxine XR (EFFEXOR-XR) 150 MG 24 hr capsule Take 1 capsule by mouth Daily. 90 capsule 3   • verapamil (CALAN) 120 MG tablet Take 1 tablet by mouth 2 (two) times a day. 1  To 2 times daily 180 tablet 3     No current facility-administered medications for this visit.       Review of Systems   Constitutional: Negative.    HENT: Negative.    Eyes: Negative.    Respiratory: Negative.    Cardiovascular: Negative.    Gastrointestinal: Negative.    Endocrine: Negative.    Genitourinary: Negative.    Musculoskeletal:        Foot care   Skin: Negative.    Allergic/Immunologic: Negative.    Neurological: Negative.    Hematological: Negative.    Psychiatric/Behavioral: Negative.          OBJECTIVE    /64   Pulse 76   Ht 162.6 cm (64\")   Wt 87.5 kg (193 lb)   SpO2 98%   BMI 33.13 kg/m²     Body mass index is 33.13 kg/m².        Physical Exam  Vitals reviewed.   Constitutional:       Appearance: Normal appearance. She is well-developed.   HENT:      Head: Normocephalic and atraumatic.   Neck:      Trachea: Trachea and phonation normal.   Cardiovascular:      Pulses:           Dorsalis pedis pulses are 1+ on the right side and 1+ on the left side.        Posterior tibial pulses are 1+ on the right side and 1+ on the left side.   Pulmonary:      Effort: Pulmonary effort is normal. No respiratory distress.   Abdominal:      General: There is no distension.      Palpations: Abdomen is soft.   Musculoskeletal:        Feet:    Feet:      Right foot:      Protective Sensation: 10 sites tested. 10 sites sensed.      Toenail Condition: Right toenails " are abnormally thick and long. Fungal disease present.     Left foot:      Protective Sensation: 10 sites tested. 10 sites sensed.      Toenail Condition: Left toenails are abnormally thick and long. Fungal disease present.  Skin:     General: Skin is warm and dry.   Neurological:      Mental Status: She is alert and oriented to person, place, and time.      GCS: GCS eye subscore is 4. GCS verbal subscore is 5. GCS motor subscore is 6.   Psychiatric:         Speech: Speech normal.         Behavior: Behavior normal. Behavior is cooperative.         Thought Content: Thought content normal.         Judgment: Judgment normal.                Procedures        ASSESSMENT AND PLAN    Diagnoses and all orders for this visit:    1. Encounter for diabetic foot exam (HCC)      Body mass index is 33.13 kg/m².    Recommend follow-up with primary care to discuss BMI greater than 30    - A diabetic foot screening exam was performed and the patient was educated on the foot complications related to diabetes,  preventative foot care and what signs and symptoms to watch for.  Instructed to contact our office if any foot problems develop before next visit.  -Discussed treatments for painful toenails. Treatment options discussed included nail removal versus debridement. Patient elected for routine nail debridement. An ABN has been signed by the patient.  - Toenails 1-5 bilateral were debrided in length and thickness with nail nipper and electric  to decrease fungal load and risk of infection.  - All the patients questions were answered.  - RTC 3 months or sooner if needed.         This document has been electronically signed by Fabio JACOBSEN, FNP-C, ONP-C on March 7, 2023 08:03 CST

## 2023-06-01 ENCOUNTER — OFFICE VISIT (OUTPATIENT)
Dept: PODIATRY | Facility: CLINIC | Age: 63
End: 2023-06-01

## 2023-06-01 VITALS — HEIGHT: 64 IN | WEIGHT: 193 LBS | BODY MASS INDEX: 32.95 KG/M2

## 2023-06-01 DIAGNOSIS — E11.42 TYPE 2 DIABETES MELLITUS WITH PERIPHERAL NEUROPATHY: ICD-10-CM

## 2023-06-01 DIAGNOSIS — G89.29 CHRONIC TOE PAIN, BILATERAL: ICD-10-CM

## 2023-06-01 DIAGNOSIS — B35.1 ONYCHOMYCOSIS: ICD-10-CM

## 2023-06-01 DIAGNOSIS — L84 CORNS: ICD-10-CM

## 2023-06-01 DIAGNOSIS — E11.9 ENCOUNTER FOR DIABETIC FOOT EXAM: Primary | ICD-10-CM

## 2023-06-01 DIAGNOSIS — M79.674 CHRONIC TOE PAIN, BILATERAL: ICD-10-CM

## 2023-06-01 DIAGNOSIS — M79.675 CHRONIC TOE PAIN, BILATERAL: ICD-10-CM

## 2023-06-01 NOTE — PROGRESS NOTES
Katiana Jin  1960  63 y.o. female   PCP: Cindy Baldwin: 02/14/2023  A1C: 9.9    06/01/2023    Chief Complaint   Patient presents with   • Left Foot - Pain, Follow-up   • Right Foot - Pain, Follow-up       History of Present Illness    Katiana Jin is a 63 y.o.female presents to the clinic today for diabetic foot care.       Past Medical History:   Diagnosis Date   • Anxiety and depression    • Asthma    • Diabetes    • Essential hypertension    • GERD (gastroesophageal reflux disease)    • Hyperlipidemia          Past Surgical History:   Procedure Laterality Date   • CHOLECYSTECTOMY     • TOTAL ABDOMINAL HYSTERECTOMY           Family History   Problem Relation Age of Onset   • Emphysema Mother    • Diabetes Father    • Lung cancer Father    • Stroke Brother    • Stomach cancer Maternal Grandmother        Allergies   Allergen Reactions   • Albuterol GI Intolerance       Social History     Socioeconomic History   • Marital status: Single   Tobacco Use   • Smoking status: Never   • Smokeless tobacco: Never   Substance and Sexual Activity   • Alcohol use: Never         Current Outpatient Medications   Medication Sig Dispense Refill   • atorvastatin (LIPITOR) 20 MG tablet Take 1 tablet by mouth Every Night. 90 tablet 3   • Cholecalciferol (Vitamin D3) 1.25 MG (57632 UT) capsule Take 1 capsule by mouth 1 (One) Time Per Week.     • fenofibrate (TRICOR) 145 MG tablet Take 1 tablet by mouth Daily.     • glipizide (GLUCOTROL) 10 MG tablet TAKE 1 TABLET BY MOUTH DAILY 30 tablet 0   • Glyxambi 25-5 MG tablet TAKE 1 TABLET BY MOUTH EVERY DAY 30 tablet 5   • hydrOXYzine (ATARAX) 25 MG tablet Take 1 tablet by mouth 3 (Three) Times a Day As Needed for Itching.     • lisinopril (PRINIVIL,ZESTRIL) 5 MG tablet Take 1 tablet by mouth Daily. 90 tablet 3   • loratadine (Claritin) 10 MG tablet Take 1 tablet by mouth Daily. 30 tablet 11   • meloxicam (MOBIC) 7.5 MG tablet Take 1 tablet by mouth Daily.     • metFORMIN  "(GLUCOPHAGE) 1000 MG tablet Take 1 tablet by mouth 2 (Two) Times a Day With Meals.     • omeprazole (priLOSEC) 20 MG capsule Take 1 capsule by mouth Daily. 90 capsule 3   • sodium-potassium-magnesium sulfates (Suprep Bowel Prep Kit) 17.5-3.13-1.6 GM/177ML solution oral solution Take 1 bottle by mouth Every 12 (Twelve) Hours. 354 mL 0   • venlafaxine XR (EFFEXOR-XR) 150 MG 24 hr capsule Take 1 capsule by mouth Daily. 90 capsule 3   • verapamil (CALAN) 120 MG tablet Take 1 tablet by mouth 2 (two) times a day. 1  To 2 times daily 180 tablet 3     No current facility-administered medications for this visit.       Review of Systems   Constitutional: Negative.    HENT: Negative.    Eyes: Negative.    Respiratory: Negative.    Cardiovascular: Negative.    Gastrointestinal: Negative.    Endocrine: Negative.    Genitourinary: Negative.    Musculoskeletal:        Foot care   Skin: Negative.    Allergic/Immunologic: Negative.    Neurological: Negative.    Hematological: Negative.    Psychiatric/Behavioral: Negative.          OBJECTIVE    Ht 162.6 cm (64\")   Wt 87.5 kg (193 lb)   BMI 33.13 kg/m²     Body mass index is 33.13 kg/m².        Physical Exam  Vitals reviewed.   Constitutional:       Appearance: Normal appearance. She is well-developed.   HENT:      Head: Normocephalic and atraumatic.   Neck:      Trachea: Trachea and phonation normal.   Cardiovascular:      Pulses:           Dorsalis pedis pulses are 1+ on the right side and 1+ on the left side.        Posterior tibial pulses are 1+ on the right side and 1+ on the left side.   Pulmonary:      Effort: Pulmonary effort is normal. No respiratory distress.   Abdominal:      General: There is no distension.      Palpations: Abdomen is soft.   Musculoskeletal:        Feet:    Feet:      Right foot:      Protective Sensation: 10 sites tested. 10 sites sensed.      Toenail Condition: Right toenails are abnormally thick and long. Fungal disease present.     Left foot:      " Protective Sensation: 10 sites tested. 10 sites sensed.      Toenail Condition: Left toenails are abnormally thick and long. Fungal disease present.  Skin:     General: Skin is warm and dry.   Neurological:      Mental Status: She is alert and oriented to person, place, and time.      GCS: GCS eye subscore is 4. GCS verbal subscore is 5. GCS motor subscore is 6.   Psychiatric:         Speech: Speech normal.         Behavior: Behavior normal. Behavior is cooperative.         Thought Content: Thought content normal.         Judgment: Judgment normal.                Procedures        ASSESSMENT AND PLAN    Diagnoses and all orders for this visit:    1. Encounter for diabetic foot exam (Primary)    2. Chronic toe pain, bilateral    3. Type 2 diabetes mellitus with peripheral neuropathy    4. Corns    5. Onychomycosis      Body mass index is 33.13 kg/m².    Recommend follow-up with primary care to discuss BMI greater than 30    - A diabetic foot screening exam was performed and the patient was educated on the foot complications related to diabetes,  preventative foot care and what signs and symptoms to watch for.  Instructed to contact our office if any foot problems develop before next visit.  -Discussed treatments for painful toenails. Treatment options discussed included nail removal versus debridement. Patient elected for routine nail debridement. An ABN has been signed by the patient.  - Toenails 1-5 bilateral were debrided in length and thickness with nail nipper and electric  to decrease fungal load and risk of infection.  - All the patients questions were answered.  - RTC 3 months or sooner if needed.         This document has been electronically signed by Fabio JACOBSEN, FNP-C, ONP-C on June 1, 2023 13:53 CDT

## 2023-08-17 ENCOUNTER — OFFICE VISIT (OUTPATIENT)
Dept: PODIATRY | Facility: CLINIC | Age: 63
End: 2023-08-17
Payer: MEDICARE

## 2023-08-17 VITALS
SYSTOLIC BLOOD PRESSURE: 102 MMHG | HEIGHT: 64 IN | OXYGEN SATURATION: 97 % | HEART RATE: 80 BPM | BODY MASS INDEX: 32.95 KG/M2 | DIASTOLIC BLOOD PRESSURE: 60 MMHG | WEIGHT: 193 LBS

## 2023-08-17 DIAGNOSIS — G89.29 CHRONIC TOE PAIN, BILATERAL: ICD-10-CM

## 2023-08-17 DIAGNOSIS — E11.42 TYPE 2 DIABETES MELLITUS WITH PERIPHERAL NEUROPATHY: ICD-10-CM

## 2023-08-17 DIAGNOSIS — E11.9 ENCOUNTER FOR DIABETIC FOOT EXAM: Primary | ICD-10-CM

## 2023-08-17 DIAGNOSIS — B35.1 ONYCHOMYCOSIS: ICD-10-CM

## 2023-08-17 DIAGNOSIS — L84 CORNS: ICD-10-CM

## 2023-08-17 DIAGNOSIS — M79.675 CHRONIC TOE PAIN, BILATERAL: ICD-10-CM

## 2023-08-17 DIAGNOSIS — M79.674 CHRONIC TOE PAIN, BILATERAL: ICD-10-CM

## 2023-08-17 NOTE — PROGRESS NOTES
Katiana Jin  1960  63 y.o. female   PCP: Cindy Baldwin: 05/30/2023  A1C: 9.9    08/17/2023    Chief Complaint   Patient presents with    Left Foot - Follow-up     Diabetic nail debridement     Right Foot - Follow-up     Diabetic nail debridement        History of Present Illness    Katiana Jin is a 63 y.o.female presents to the clinic today for diabetic foot care.       Past Medical History:   Diagnosis Date    Anxiety and depression     Asthma     Diabetes     Essential hypertension     GERD (gastroesophageal reflux disease)     Hyperlipidemia          Past Surgical History:   Procedure Laterality Date    CHOLECYSTECTOMY      TOTAL ABDOMINAL HYSTERECTOMY           Family History   Problem Relation Age of Onset    Emphysema Mother     Diabetes Father     Lung cancer Father     Stroke Brother     Stomach cancer Maternal Grandmother        Allergies   Allergen Reactions    Albuterol GI Intolerance       Social History     Socioeconomic History    Marital status: Single   Tobacco Use    Smoking status: Never    Smokeless tobacco: Never   Substance and Sexual Activity    Alcohol use: Never         Current Outpatient Medications   Medication Sig Dispense Refill    atorvastatin (LIPITOR) 20 MG tablet Take 1 tablet by mouth Every Night. 90 tablet 3    Cholecalciferol (Vitamin D3) 1.25 MG (08332 UT) capsule Take 1 capsule by mouth 1 (One) Time Per Week.      fenofibrate (TRICOR) 145 MG tablet Take 1 tablet by mouth Daily.      glipizide (GLUCOTROL) 10 MG tablet TAKE 1 TABLET BY MOUTH DAILY 30 tablet 0    Glyxambi 25-5 MG tablet TAKE 1 TABLET BY MOUTH EVERY DAY 30 tablet 5    hydrOXYzine (ATARAX) 25 MG tablet Take 1 tablet by mouth 3 (Three) Times a Day As Needed for Itching.      lisinopril (PRINIVIL,ZESTRIL) 5 MG tablet Take 1 tablet by mouth Daily. 90 tablet 3    loratadine (Claritin) 10 MG tablet Take 1 tablet by mouth Daily. 30 tablet 11    meloxicam (MOBIC) 7.5 MG tablet Take 1 tablet by mouth Daily.    "   metFORMIN (GLUCOPHAGE) 1000 MG tablet Take 1 tablet by mouth 2 (Two) Times a Day With Meals.      omeprazole (priLOSEC) 20 MG capsule Take 1 capsule by mouth Daily. 90 capsule 3    sodium-potassium-magnesium sulfates (Suprep Bowel Prep Kit) 17.5-3.13-1.6 GM/177ML solution oral solution Take 1 bottle by mouth Every 12 (Twelve) Hours. 354 mL 0    venlafaxine XR (EFFEXOR-XR) 150 MG 24 hr capsule Take 1 capsule by mouth Daily. 90 capsule 3    verapamil (CALAN) 120 MG tablet Take 1 tablet by mouth 2 (two) times a day. 1  To 2 times daily 180 tablet 3     No current facility-administered medications for this visit.       Review of Systems   Constitutional: Negative.    HENT: Negative.     Eyes: Negative.    Respiratory: Negative.     Cardiovascular: Negative.    Gastrointestinal: Negative.    Endocrine: Negative.    Genitourinary: Negative.    Musculoskeletal:         Foot care   Skin: Negative.    Allergic/Immunologic: Negative.    Neurological: Negative.    Hematological: Negative.    Psychiatric/Behavioral: Negative.         OBJECTIVE    /60   Pulse 80   Ht 162.6 cm (64\")   Wt 87.5 kg (193 lb)   SpO2 97%   BMI 33.13 kg/mý     Body mass index is 33.13 kg/mý.        Physical Exam  Vitals reviewed.   Constitutional:       Appearance: Normal appearance. She is well-developed.   HENT:      Head: Normocephalic and atraumatic.   Neck:      Trachea: Trachea and phonation normal.   Cardiovascular:      Pulses:           Dorsalis pedis pulses are 1+ on the right side and 1+ on the left side.        Posterior tibial pulses are 1+ on the right side and 1+ on the left side.   Pulmonary:      Effort: Pulmonary effort is normal. No respiratory distress.   Abdominal:      General: There is no distension.      Palpations: Abdomen is soft.   Musculoskeletal:        Feet:    Feet:      Right foot:      Protective Sensation: 10 sites tested.  10 sites sensed.      Toenail Condition: Right toenails are abnormally thick and " long. Fungal disease present.     Left foot:      Protective Sensation: 10 sites tested.  10 sites sensed.      Toenail Condition: Left toenails are abnormally thick and long. Fungal disease present.  Skin:     General: Skin is warm and dry.   Neurological:      Mental Status: She is alert and oriented to person, place, and time.      GCS: GCS eye subscore is 4. GCS verbal subscore is 5. GCS motor subscore is 6.   Psychiatric:         Speech: Speech normal.         Behavior: Behavior normal. Behavior is cooperative.         Thought Content: Thought content normal.         Judgment: Judgment normal.              Procedures        ASSESSMENT AND PLAN    Diagnoses and all orders for this visit:    1. Encounter for diabetic foot exam (Primary)    2. Chronic toe pain, bilateral    3. Corns    4. Type 2 diabetes mellitus with peripheral neuropathy    5. Onychomycosis      Body mass index is 33.13 kg/mý.    Recommend follow-up with primary care to discuss BMI greater than 30    - A diabetic foot screening exam was performed and the patient was educated on the foot complications related to diabetes,  preventative foot care and what signs and symptoms to watch for.  Instructed to contact our office if any foot problems develop before next visit.  -Discussed treatments for painful toenails. Treatment options discussed included nail removal versus debridement. Patient elected for routine nail debridement. An ABN has been signed by the patient.  - Toenails 1-5 bilateral were debrided in length and thickness with nail nipper and electric  to decrease fungal load and risk of infection.  - All the patients questions were answered.  - RTC 3 months or sooner if needed.         This document has been electronically signed by Fabio JACOBSEN FNLAKSHMI, ONP-C on August 17, 2023 10:10 CDT

## 2023-08-30 RX ORDER — METFORMIN HYDROCHLORIDE 500 MG/1
TABLET, EXTENDED RELEASE ORAL
Qty: 60 TABLET | Refills: 0 | OUTPATIENT
Start: 2023-08-30